# Patient Record
Sex: FEMALE | Race: BLACK OR AFRICAN AMERICAN | NOT HISPANIC OR LATINO | Employment: UNEMPLOYED | URBAN - METROPOLITAN AREA
[De-identification: names, ages, dates, MRNs, and addresses within clinical notes are randomized per-mention and may not be internally consistent; named-entity substitution may affect disease eponyms.]

---

## 2018-06-16 ENCOUNTER — HOSPITAL ENCOUNTER (EMERGENCY)
Facility: OTHER | Age: 38
Discharge: HOME | End: 2018-06-16
Attending: EMERGENCY MEDICINE
Payer: MEDICAID

## 2018-06-16 VITALS
WEIGHT: 194 LBS | HEART RATE: 55 BPM | HEIGHT: 63 IN | RESPIRATION RATE: 17 BRPM | OXYGEN SATURATION: 99 % | DIASTOLIC BLOOD PRESSURE: 92 MMHG | TEMPERATURE: 98 F | SYSTOLIC BLOOD PRESSURE: 167 MMHG | BODY MASS INDEX: 34.38 KG/M2

## 2018-06-16 DIAGNOSIS — M25.562 LEFT KNEE PAIN: Primary | ICD-10-CM

## 2018-06-16 LAB
B-HCG UR QL: NEGATIVE
CTP QC/QA: YES

## 2018-06-16 PROCEDURE — 63600175 PHARM REV CODE 636 W HCPCS: Performed by: EMERGENCY MEDICINE

## 2018-06-16 PROCEDURE — 99284 EMERGENCY DEPT VISIT MOD MDM: CPT | Mod: 25

## 2018-06-16 PROCEDURE — 81025 URINE PREGNANCY TEST: CPT | Performed by: EMERGENCY MEDICINE

## 2018-06-16 PROCEDURE — 96372 THER/PROPH/DIAG INJ SC/IM: CPT

## 2018-06-16 RX ORDER — ALBUTEROL SULFATE 90 UG/1
1-2 AEROSOL, METERED RESPIRATORY (INHALATION) EVERY 6 HOURS PRN
Qty: 1 INHALER | Refills: 0 | Status: SHIPPED | OUTPATIENT
Start: 2018-06-16 | End: 2019-06-16

## 2018-06-16 RX ORDER — LISINOPRIL AND HYDROCHLOROTHIAZIDE 12.5; 2 MG/1; MG/1
1 TABLET ORAL DAILY
Qty: 30 TABLET | Refills: 0 | Status: SHIPPED | OUTPATIENT
Start: 2018-06-16

## 2018-06-16 RX ORDER — KETOROLAC TROMETHAMINE 30 MG/ML
15 INJECTION, SOLUTION INTRAMUSCULAR; INTRAVENOUS
Status: COMPLETED | OUTPATIENT
Start: 2018-06-16 | End: 2018-06-16

## 2018-06-16 RX ORDER — LISINOPRIL AND HYDROCHLOROTHIAZIDE 12.5; 2 MG/1; MG/1
1 TABLET ORAL DAILY
COMMUNITY
End: 2018-06-16

## 2018-06-16 RX ORDER — IBUPROFEN 400 MG/1
400 TABLET ORAL 3 TIMES DAILY PRN
Qty: 20 TABLET | Refills: 0 | Status: SHIPPED | OUTPATIENT
Start: 2018-06-16

## 2018-06-16 RX ADMIN — KETOROLAC TROMETHAMINE 15 MG: 30 INJECTION, SOLUTION INTRAMUSCULAR at 10:06

## 2018-06-17 NOTE — ED PROVIDER NOTES
"Encounter Date: 2018    SCRIBE #1 NOTE: I, Bina Reyna, am scribing for, and in the presence of, Dr. Terrazas.       History     Chief Complaint   Patient presents with    Knee Pain     Pt reports left knee pain and swelling x 3 days, reports running out of "fluid pill" last week     Time seen by provider: 9:56 PM    This is a 37 y.o. female, with history of HTN, who presents with complaint of left knee pain and swelling for a few days. She denies trauma or fall. She reports SOB and chronic leg shortening. She denies fever, chest pain, difficulty ambulating, numbness, or weakness. She has not taken lisinopril-hydrochlorothiazide in three weeks. She moved here from [place] one month ago and has not established primary care here.      The history is provided by the patient.     Review of patient's allergies indicates:   Allergen Reactions    Compazine [prochlorperazine]     Geodon [ziprasidone hcl]      Past Medical History:   Diagnosis Date    Hypertension      Past Surgical History:   Procedure Laterality Date     SECTION      HIP FUSION Left      No family history on file.  Social History   Substance Use Topics    Smoking status: Current Every Day Smoker    Smokeless tobacco: Not on file    Alcohol use No     Review of Systems   Constitutional: Negative for fever.   HENT: Negative for sore throat.    Respiratory: Positive for shortness of breath.    Cardiovascular: Negative for chest pain.   Gastrointestinal: Negative for abdominal pain, nausea and vomiting.   Genitourinary: Negative for dysuria.   Musculoskeletal: Positive for arthralgias (left knee) and joint swelling (left knee). Negative for back pain and gait problem.        Positive for leg shortening.   Skin: Negative for rash.   Neurological: Negative for weakness, numbness and headaches.   Hematological: Does not bruise/bleed easily.       Physical Exam     Initial Vitals [18 2101]   BP Pulse Resp Temp SpO2   138/61 70 18 98.1 °F " (36.7 °C) 98 %      MAP       --         Physical Exam    Nursing note and vitals reviewed.  Constitutional: She appears well-developed and well-nourished. She is not diaphoretic. No distress.   HENT:   Head: Normocephalic and atraumatic.   Eyes: Conjunctivae and EOM are normal.   Neck: Normal range of motion. Neck supple.   Cardiovascular: Normal rate, regular rhythm and normal heart sounds. Exam reveals no gallop and no friction rub.    No murmur heard.  Pulses:       Dorsalis pedis pulses are 2+ on the right side, and 2+ on the left side.   Pulmonary/Chest: Breath sounds normal. No respiratory distress. She has no wheezes. She has no rhonchi. She has no rales.   Musculoskeletal: Normal range of motion. She exhibits tenderness. She exhibits no edema.   Left knee with diffuse tenderness. No bony tenderness of the tibia.    Neurological: She is alert and oriented to person, place, and time.   Skin: No erythema.   No warmth to left knee.         ED Course   Procedures  Labs Reviewed   POCT URINE PREGNANCY     Imaging Results          X-Ray Knee 3 View Left (Final result)  Result time 06/16/18 22:29:22    Final result by Leoncio Louise MD (06/16/18 22:29:22)                 Impression:      No radiographic evidence of acute osseous injury of the left knee.      Electronically signed by: Leoncio Louise MD  Date:    06/16/2018  Time:    22:29             Narrative:    EXAMINATION:  XR KNEE 3 VIEW LEFT    CLINICAL HISTORY:  Pain in left knee    TECHNIQUE:  AP, lateral, and Merchant views of the left knee were performed.    COMPARISON:  None    FINDINGS:  There is no radiographic evidence of acute fracture or dislocation.  No significant joint effusion appreciated.  Radiopaque foreign body within the visualized soft tissues.                                   X-Ray Knee 3 View Left   Final Result      No radiographic evidence of acute osseous injury of the left knee.         Electronically signed by: Leoncio Louise  MD   Date:    06/16/2018   Time:    22:29        X-Rays:   Independently Interpreted Readings:   Other Readings:  Left Knee: No fracture or dislocation. No effusion.    Medical Decision Making:   Clinical Tests:   Lab Tests: Ordered and Reviewed  Radiological Study: Ordered and Reviewed  ED Management:  Well-appearing patient presents complaining primarily of left knee pain. She reports she has been working a lot standing and bending.  There is no sign of septic joint.  It is not hot.  It is swollen.  There is no erythema.  She has full range of motion.  She is able to bear weight.  Likely overuse injury. Ice wrap and Ace.  X-ray negative for acute abnormalities.  It is unilateral unlikely related to her noncompliance with antihypertensives.  I have renewed her prescription at her request.  I have also refilled her albuterol at her request.  She reports asthma type symptoms but has normal exam.  No findings concerning for pulmonary embolism.  She is new to the area I provided her primary care follow-up.    I did have an extensive talk regarding signs to return for and need for follow up. Patient expressed understanding and will monitor symptoms closely and follow-up as needed.    BRYCE Terraazs M.D.  06/17/2018  1:02 AM              Scribe Attestation:   Scribe #1: I performed the above scribed service and the documentation accurately describes the services I performed. I attest to the accuracy of the note.    Attending Attestation:           Physician Attestation for Scribe:  Physician Attestation Statement for Scribe #1: I, Dr. Terrazas, reviewed documentation, as scribed by Bina Reyna in my presence, and it is both accurate and complete.                    Clinical Impression:     1. Left knee pain                                 Shayne Terrazas MD  06/17/18 0102

## 2018-06-17 NOTE — ED NOTES
"Pt has left knee "tightness" with pain and swelling for a few days. Pt has not taken BP medications for a couple weeks. Pt denies trauma.  "

## 2018-07-13 ENCOUNTER — HOSPITAL ENCOUNTER (EMERGENCY)
Facility: OTHER | Age: 38
Discharge: HOME | End: 2018-07-13
Attending: EMERGENCY MEDICINE
Payer: MEDICAID

## 2018-07-13 VITALS
RESPIRATION RATE: 20 BRPM | DIASTOLIC BLOOD PRESSURE: 114 MMHG | HEART RATE: 82 BPM | WEIGHT: 220.44 LBS | OXYGEN SATURATION: 96 % | TEMPERATURE: 98 F | HEIGHT: 63 IN | BODY MASS INDEX: 39.06 KG/M2 | SYSTOLIC BLOOD PRESSURE: 176 MMHG

## 2018-07-13 DIAGNOSIS — F19.10 POLYSUBSTANCE ABUSE: Primary | ICD-10-CM

## 2018-07-13 DIAGNOSIS — I10 HYPERTENSION, POOR CONTROL: ICD-10-CM

## 2018-07-13 PROCEDURE — 99283 EMERGENCY DEPT VISIT LOW MDM: CPT

## 2018-07-14 NOTE — ED NOTES
Patient Belongings.   Blue hand towel  1 beige purse  1 pack of cigarettes   Irvin watch  1 bottle of perfume  Toothbrush  Pack of cards  Lotions  Toothpaste  Hand   Employee ID card  Misc. Papers  Midol Tablets  Hand mirror w/ crack   Mult. Cards Paper and Plastic   Social Security Card   2 lighters  2 pipes with drug paraphernalia given to security for confiscation

## 2018-07-14 NOTE — ED PROVIDER NOTES
"Encounter Date: 7/13/2018    SCRIBE #1 NOTE: I, Javier Aden, am scribing for, and in the presence of, Dr. Rubio.       History     Chief Complaint   Patient presents with    Suicidal     pt states she wants to end her life because she can't get off drugs, wants to OD     Seen by provider: 7:19 PM    Patient is a 37 y.o. female who presents to the ED for drug detoxification. Patient states she has been injecting heroin and smoking crack-cocaine for "2 months straight." She states "I'm tired of using, I need some help. I'm here to get detox. I need help for shooting heroin and smoking crack cocaine." Patient states she last used drugs at 2:00 AM this morning and reports diffuse body pain currently. She states this is the first place she has come in search of detox. She states "they told me I had to say I was suicidal to get help for detox." She denies suicidal ideations and states "I don't want to end my life I just want to get off the drugs, I want to make my life better." She reports a history of PTSD, bipolar disorder, and schizophrenia. She states she has had psychiatric care in the past and is prescribed medications, but has been non-compliant for many years since her childhood. She also reports a history of HTN and states she is supposed to take lisinopril, however she has been non-compliant for an unclear amount of time. She reports allergies to compazine and Geodon. She does not have a local PCP or clinic, and notes she moved to [place] from [place] 2 months ago. She states she was not using any drugs when she was living in [place]. She also admits to drinking "a couple beers a day," and smoking tobacco.      The history is provided by the patient.     Review of patient's allergies indicates:   Allergen Reactions    Compazine [prochlorperazine]     Geodon [ziprasidone hcl]      Past Medical History:   Diagnosis Date    Bipolar 1 disorder     Depression     Hypertension     PTSD (post-traumatic " stress disorder)     Schizophrenia      Past Surgical History:   Procedure Laterality Date     SECTION      HIP FUSION Left      History reviewed. No pertinent family history.  Social History   Substance Use Topics    Smoking status: Current Every Day Smoker    Smokeless tobacco: Never Used    Alcohol use No     Review of Systems   Constitutional: Negative for chills and fever.   HENT: Negative for congestion.    Eyes: Negative for visual disturbance.   Respiratory: Negative for shortness of breath.    Cardiovascular: Negative for chest pain.   Gastrointestinal: Negative for diarrhea, nausea and vomiting.   Genitourinary: Negative for dysuria.   Musculoskeletal: Positive for myalgias. Negative for gait problem.   Skin: Negative for rash.   Neurological: Negative for headaches.   Psychiatric/Behavioral: Negative for confusion, hallucinations, self-injury and suicidal ideas.        Positive for drug addiction.       Physical Exam     Initial Vitals [18 1904]   BP Pulse Resp Temp SpO2   (!) 176/114 82 20 98.3 °F (36.8 °C) 96 %      MAP       --         Physical Exam    Nursing note and vitals reviewed.  Constitutional: She appears well-developed and well-nourished. She is not diaphoretic. She is Obese . No distress.   Anxious, but in no distress.   HENT:   Head: Normocephalic and atraumatic.   Eyes: Conjunctivae and EOM are normal. Pupils are equal, round, and reactive to light.   Neck: Normal range of motion. Neck supple.   Cardiovascular: Normal rate, regular rhythm and normal heart sounds. Exam reveals no gallop and no friction rub.    No murmur heard.  Pulmonary/Chest: Breath sounds normal. No respiratory distress. She has no wheezes. She has no rhonchi. She has no rales.   Abdominal: Soft. There is no tenderness.   Musculoskeletal: Normal range of motion.   Neurological: She is alert and oriented to person, place, and time.   Skin: Skin is warm and dry.   Psychiatric:   Linear goal-directed  thought. No delirium or delusion. Denies any desire to harm herself.         ED Course   Procedures  Labs Reviewed - No data to display       Imaging Results    None                     Scribe Attestation:   Scribe #1: I performed the above scribed service and the documentation accurately describes the services I performed. I attest to the accuracy of the note.    Attending Attestation:           Physician Attestation for Scribe:  Physician Attestation Statement for Scribe #1: I, Dr. Rubio, reviewed documentation, as scribed by Javier Aden in my presence, and it is both accurate and complete.           Patient presents initially reporting suicidal ideation.  Stating that she is so tired of her drug dependency that she wants to kill herself.  Once the patient was informed of the plan to place her on a 72 hr hold, and have her transferred for psychiatric evaluation and stabilization , and she realized that this was not specifically a detox treatment center, she then reported that she was only lying about the suicidal ideation to get into a detox program because that is what they told me I had to do the patient is adamant that she does not want to kill herself she actually is looking for help to improve her life, not and it.  She has been given a list of the psychiatric clinics as well as substance abuse clinics, detox centers.             Clinical Impression:     1. Polysubstance abuse    2. Hypertension, poor control                                  Cyrus Rubio II, MD  07/16/18 1134

## 2018-07-14 NOTE — ED TRIAGE NOTES
"Patient presents to ED stating she wanted to overdose on drugs. Patient reports she has been shooting up heroin and smoking crack for the past two months. Patient reports she wants to get help to detox, states "I was told I had to say I wanted to kill myself to get into detox". Patient states "If y'all can't help me detox then I want to leave. I don't need to be on suicidal watch or 72 hours. I just need to get into detox". Pt AAO x4.    "

## 2018-07-14 NOTE — ED NOTES
Patient changed into paper scrubs and searched for harmful objects. ED tech Renae at bedside for direct observation.

## 2018-07-14 NOTE — ED NOTES
"Belongings given back to patient. Patient given information on detox clinics. Patient escorted to waiting room by RN. Patient speaking with  saying "They don't do detox here. All they would do is put me on a 72 hour suicidal watch and I don't need that because I'm not suicidal. I just need detox".   "

## 2019-12-14 ENCOUNTER — HOSPITAL ENCOUNTER (EMERGENCY)
Facility: OTHER | Age: 39
Discharge: HOME | End: 2019-12-14
Attending: EMERGENCY MEDICINE

## 2019-12-14 VITALS
HEART RATE: 87 BPM | RESPIRATION RATE: 20 BRPM | WEIGHT: 220 LBS | DIASTOLIC BLOOD PRESSURE: 93 MMHG | OXYGEN SATURATION: 100 % | HEIGHT: 63 IN | BODY MASS INDEX: 38.98 KG/M2 | SYSTOLIC BLOOD PRESSURE: 161 MMHG | TEMPERATURE: 99 F

## 2019-12-14 DIAGNOSIS — F11.93 WITHDRAWAL FROM OPIOIDS: ICD-10-CM

## 2019-12-14 DIAGNOSIS — R07.9 CHEST PAIN: ICD-10-CM

## 2019-12-14 DIAGNOSIS — F11.29 OPIOID DEPENDENCE WITH OPIOID-INDUCED DISORDER: Primary | ICD-10-CM

## 2019-12-14 PROCEDURE — 99284 EMERGENCY DEPT VISIT MOD MDM: CPT | Mod: 25

## 2019-12-14 PROCEDURE — 93010 ELECTROCARDIOGRAM REPORT: CPT | Mod: ,,, | Performed by: INTERNAL MEDICINE

## 2019-12-14 PROCEDURE — 25000003 PHARM REV CODE 250: Performed by: EMERGENCY MEDICINE

## 2019-12-14 PROCEDURE — 93010 EKG 12-LEAD: ICD-10-PCS | Mod: ,,, | Performed by: INTERNAL MEDICINE

## 2019-12-14 PROCEDURE — 93005 ELECTROCARDIOGRAM TRACING: CPT

## 2019-12-14 RX ORDER — CHLORDIAZEPOXIDE HYDROCHLORIDE 25 MG/1
25 CAPSULE, GELATIN COATED ORAL 3 TIMES DAILY
Qty: 30 CAPSULE | Refills: 0 | Status: SHIPPED | OUTPATIENT
Start: 2019-12-14 | End: 2019-12-24

## 2019-12-14 RX ORDER — CHLORDIAZEPOXIDE HYDROCHLORIDE 10 MG/1
50 CAPSULE, GELATIN COATED ORAL ONCE
Status: COMPLETED | OUTPATIENT
Start: 2019-12-14 | End: 2019-12-14

## 2019-12-14 RX ORDER — CLONIDINE HYDROCHLORIDE 0.1 MG/1
0.1 TABLET ORAL 3 TIMES DAILY
Qty: 30 TABLET | Refills: 0 | Status: SHIPPED | OUTPATIENT
Start: 2019-12-14 | End: 2020-01-05 | Stop reason: CLARIF

## 2019-12-14 RX ORDER — GABAPENTIN 300 MG/1
300 CAPSULE ORAL ONCE
Status: COMPLETED | OUTPATIENT
Start: 2019-12-14 | End: 2019-12-14

## 2019-12-14 RX ORDER — CLONIDINE HYDROCHLORIDE 0.1 MG/1
0.1 TABLET ORAL
Status: COMPLETED | OUTPATIENT
Start: 2019-12-14 | End: 2019-12-14

## 2019-12-14 RX ORDER — GABAPENTIN 300 MG/1
300 CAPSULE ORAL 3 TIMES DAILY
Status: DISCONTINUED | OUTPATIENT
Start: 2019-12-14 | End: 2019-12-14

## 2019-12-14 RX ADMIN — CHLORDIAZEPOXIDE HYDROCHLORIDE 50 MG: 10 CAPSULE ORAL at 05:12

## 2019-12-14 RX ADMIN — CLONIDINE HYDROCHLORIDE 0.1 MG: 0.1 TABLET ORAL at 05:12

## 2019-12-14 RX ADMIN — GABAPENTIN 300 MG: 300 CAPSULE ORAL at 05:12

## 2019-12-14 NOTE — ED NOTES
"Pt states "I had a tubal ligation, I cant be pregnant, I dont have to pee right now." MD notified. No need for UPT at this per Dr. Pedraza.   "

## 2019-12-14 NOTE — ED NOTES
"States hx of heroine IV abuse, last used x yesterday "seomtime in the early morning." states she is here for detox, stating "I just need detox, they told me to come here for that and I can go to [facility] tomorrow." Also reporting upper chest pain, rib pain, SOB, "pain all over", lower leg cramping. Pt AAOx4 and appropriate at this time. Respirations even and unlabored. No acute distress noted.    "

## 2019-12-14 NOTE — ED PROVIDER NOTES
CHIEF COMPLAINT  Chief Complaint   Patient presents with    detoxing off of herion     pt with c/o detoxing off of herion to go to  [facility] tomorrow .       HPI  Maki Amezquita is a 39 y.o. female with a past medical history of hypertension who presents with detoxing off of heroin today. She also reports associated fever, chills, nausea, vomiting, diarrhea, and generalized body aches. She states she was told by [facility] to come here as they do not have room for her until 8:00 am tomorrow morning. Her last use was yesterday morning.    This is the extent of the patient's complaints at this time.       PAST MEDICAL HISTORY  Past Medical History:   Diagnosis Date    Bipolar 1 disorder     Depression     Hypertension     PTSD (post-traumatic stress disorder)     Schizophrenia        CURRENT MEDICATIONS    No current facility-administered medications for this encounter.     Current Outpatient Medications:     albuterol 90 mcg/actuation inhaler, Inhale 1-2 puffs into the lungs every 6 (six) hours as needed for Wheezing. Rescue, Disp: 1 Inhaler, Rfl: 0    chlordiazepoxide (LIBRIUM) 25 MG Cap, Take 1 capsule (25 mg total) by mouth 3 (three) times daily. for 10 days, Disp: 30 capsule, Rfl: 0    cloNIDine (CATAPRES) 0.1 MG tablet, Take 1 tablet (0.1 mg total) by mouth 3 (three) times daily., Disp: 30 tablet, Rfl: 0    ibuprofen (ADVIL,MOTRIN) 400 MG tablet, Take 1 tablet (400 mg total) by mouth 3 (three) times daily as needed., Disp: 20 tablet, Rfl: 0    lisinopril-hydrochlorothiazide (PRINZIDE,ZESTORETIC) 20-12.5 mg per tablet, Take 1 tablet by mouth once daily., Disp: 30 tablet, Rfl: 0    ALLERGIES    Review of patient's allergies indicates:   Allergen Reactions    Compazine [prochlorperazine]     Geodon [ziprasidone hcl]        SURGICAL HISTORY    Past Surgical History:   Procedure Laterality Date     SECTION      HIP FUSION Left        SOCIAL HISTORY    Social History     Socioeconomic  History    Marital status: Single     Spouse name: Not on file    Number of children: Not on file    Years of education: Not on file    Highest education level: Not on file   Occupational History    Not on file   Social Needs    Financial resource strain: Not on file    Food insecurity:     Worry: Not on file     Inability: Not on file    Transportation needs:     Medical: Not on file     Non-medical: Not on file   Tobacco Use    Smoking status: Current Every Day Smoker    Smokeless tobacco: Never Used   Substance and Sexual Activity    Alcohol use: No    Drug use: Yes     Types: Marijuana    Sexual activity: Not on file   Lifestyle    Physical activity:     Days per week: Not on file     Minutes per session: Not on file    Stress: Not on file   Relationships    Social connections:     Talks on phone: Not on file     Gets together: Not on file     Attends Yarsanism service: Not on file     Active member of club or organization: Not on file     Attends meetings of clubs or organizations: Not on file     Relationship status: Not on file   Other Topics Concern    Not on file   Social History Narrative    Not on file       FAMILY HISTORY    No family history on file.    REVIEW OF SYSTEMS    Constitutional:  Positive chills. Positive for generalized body aches.  Eyes:  No redness, pain, or discharge.   HENT:  No ear pain, no sudden onset headache, no throat pain.  Respiratory:  No wheezing, cough, or shortness of breath.   Cardiovascular:  No chest pain or palpitations.  GI:  No abdominal pain. Positive for nausea, vomiting, and diarrhea.   : No dysuria or discharge.  Musculoskeletal:  No injury; full range of motion.   Skin:  No rash, abscess, or laceration.  Psychiatric: No suicidal ideations, homicidal ideations, auditory or visual hallucinations  Neurologic:  No focal weakness or sensory changes.   All Systems otherwise negative except as noted in the Review of Systems and History of Present  "Illness.    PHYSICAL EXAM    VITAL SIGNS: BP (!) 161/93   Pulse 98   Temp 98.9 °F (37.2 °C) (Oral)   Resp 20   Ht 5' 3" (1.6 m)   Wt 99.8 kg (220 lb)   SpO2 100%   BMI 38.97 kg/m²    Constitutional:  No acute distress.  Well developed, well nourished, alert & oriented x 3, non-toxic appearance.   HENT:  Normocephalic, atraumatic.  Normal ears, nose, and throat.  Eyes:  PERRL, EOMI, conjunctiva normal.  Neck: Normal range of motion, no tenderness, supple.  Respiratory:  Nonlabored breathing with normal breath sounds; no respiratory distress.  Cardiovascular:  RRR with no pulse deficit.  : normal external genitalia   GI:  Soft, nontender, no rebound or guarding.  Musculoskeletal: Normal ROM, no tenderness, injury, edema.  Integument:  Warm, dry skin without infection or injury.  Neurologic:  Normal motor, sensation with no focal deficit.  Psychiatric:  Affect normal, Judgment normal, Mood normal. No SI, HI and not gravely disabled.    LABS  Pertinent labs reviewed. (See chart for details)   Labs Reviewed - No data to display        PROCEDURES    Procedures    Medications   cloNIDine tablet 0.1 mg (0.1 mg Oral Given 12/14/19 1730)   chlordiazepoxide capsule 50 mg (50 mg Oral Given 12/14/19 1749)   gabapentin capsule 300 mg (300 mg Oral Given 12/14/19 1730)       ED COURSE & MEDICAL DECISION MAKING      Pertinent & Imaging studies reviewed. (See chart for details)    Differential Diagnosis: Clearly having withdrawal symptoms, body aches, myalgias expected after cessation of heroin. No evidence of sepsis, lungs clear.  Plan for symptomatic care with clonidine, librium and gabapentin; [facility]           Discontinued Medications    No medications on file       New Prescriptions    CHLORDIAZEPOXIDE (LIBRIUM) 25 MG CAP    Take 1 capsule (25 mg total) by mouth 3 (three) times daily. for 10 days    CLONIDINE (CATAPRES) 0.1 MG TABLET    Take 1 tablet (0.1 mg total) by mouth 3 (three) times daily.       FINAL " DIAGNOSIS  1. Opiate Withdrawal    DISPOSITION  Patient discharged in stable condition.    I discussed with patient and/or family/caretaker that this evaluation in the ED does not suggest any emergent or life threatening condition medical condition requiring admission or immediate intervention beyond what was provided in the ED.  Regardless, an unremarkable evaluation in the ED does not preclude the development or presence of a serious or life threatening condition. As such, patient was instructed to return immediately for any worsening or change in current symptoms.       Critical care time spent with this patient (not including billable procedures) was 15 minutes.    Bronwyn Pedraza MD  Emergency Medicine  [facility]  12/14/2019 4:45 PM    I have reviewed the notes, assessments, and/or procedures performed by Aelxis sullivan and agree with documentation of this patient    Please pardon typos or dictation errors, as this note was transcribed using Cribspot direct dictation software.         Bronwyn Pedraza MD  12/14/19 [id number]

## 2019-12-15 NOTE — ED NOTES
Pt AAOx4 and appropriate at this time. Respirations even and unlabored. No acute distress noted. Lying in ED stretcher. Pt updated on POC. Bed is locked and in lowest position with side rails up x2. Call bell within reach and pt oriented to use of call bell.

## 2019-12-15 NOTE — ED NOTES
Upon RN entering room to discharge pt, pt not in room, belongings gone. Left without discharge paperwork.

## 2020-01-05 ENCOUNTER — HOSPITAL ENCOUNTER (EMERGENCY)
Facility: OTHER | Age: 40
Discharge: HOME | End: 2020-01-06
Attending: EMERGENCY MEDICINE
Payer: MEDICAID

## 2020-01-05 VITALS
HEIGHT: 63 IN | DIASTOLIC BLOOD PRESSURE: 84 MMHG | HEART RATE: 84 BPM | SYSTOLIC BLOOD PRESSURE: 136 MMHG | WEIGHT: 200 LBS | OXYGEN SATURATION: 98 % | TEMPERATURE: 99 F | BODY MASS INDEX: 35.44 KG/M2 | RESPIRATION RATE: 16 BRPM

## 2020-01-05 DIAGNOSIS — K08.89 PAIN, DENTAL: Primary | ICD-10-CM

## 2020-01-05 LAB
B-HCG UR QL: NEGATIVE
CTP QC/QA: YES

## 2020-01-05 PROCEDURE — 99284 EMERGENCY DEPT VISIT MOD MDM: CPT | Mod: 25

## 2020-01-05 PROCEDURE — 96372 THER/PROPH/DIAG INJ SC/IM: CPT

## 2020-01-05 PROCEDURE — 81025 URINE PREGNANCY TEST: CPT | Performed by: EMERGENCY MEDICINE

## 2020-01-05 PROCEDURE — 63600175 PHARM REV CODE 636 W HCPCS: Performed by: PHYSICIAN ASSISTANT

## 2020-01-05 RX ORDER — KETOROLAC TROMETHAMINE 30 MG/ML
15 INJECTION, SOLUTION INTRAMUSCULAR; INTRAVENOUS
Status: COMPLETED | OUTPATIENT
Start: 2020-01-05 | End: 2020-01-05

## 2020-01-05 RX ORDER — FLUCONAZOLE 150 MG/1
150 TABLET ORAL ONCE
Qty: 1 TABLET | Refills: 0 | Status: SHIPPED | OUTPATIENT
Start: 2020-01-06 | End: 2020-01-06

## 2020-01-05 RX ORDER — PENICILLIN V POTASSIUM 500 MG/1
500 TABLET, FILM COATED ORAL 4 TIMES DAILY
Qty: 40 TABLET | Refills: 0 | Status: SHIPPED | OUTPATIENT
Start: 2020-01-05 | End: 2020-01-15

## 2020-01-05 RX ORDER — OXAPROZIN 600 MG/1
600 TABLET, FILM COATED ORAL DAILY
Qty: 10 TABLET | Refills: 0 | Status: SHIPPED | OUTPATIENT
Start: 2020-01-05

## 2020-01-05 RX ADMIN — KETOROLAC TROMETHAMINE 15 MG: 30 INJECTION, SOLUTION INTRAMUSCULAR; INTRAVENOUS at 11:01

## 2020-01-06 NOTE — ED PROVIDER NOTES
Encounter Date: 2020       History     Chief Complaint   Patient presents with    Dental Pain     pt presents to ED with c/o oral pain, wisdom teeth are infected and needs an antibiotic     Patient is 39 year old female who presents with complaints of dental pain for the past two days PTA. She reports no dental fracture, trauma or injury. She reports that she has not been taking any medications PTA. She reports she has a dental appointment in 4 days. She denies fever, chills, nausea, vomiting, chest pain or shortness of breath.  She is currently unaccompanied in the ER.        Review of patient's allergies indicates:   Allergen Reactions    Compazine [prochlorperazine]     Geodon [ziprasidone hcl]      Past Medical History:   Diagnosis Date    Bipolar 1 disorder     Depression     Hypertension     PTSD (post-traumatic stress disorder)     Schizophrenia      Past Surgical History:   Procedure Laterality Date     SECTION      HIP FUSION Left      No family history on file.  Social History     Tobacco Use    Smoking status: Current Every Day Smoker    Smokeless tobacco: Never Used   Substance Use Topics    Alcohol use: No    Drug use: Yes     Types: Marijuana     Review of Systems   Constitutional: Negative for fever.   HENT: Positive for dental problem. Negative for sore throat.    Respiratory: Negative for shortness of breath.    Cardiovascular: Negative for chest pain.   Gastrointestinal: Negative for nausea.   Genitourinary: Negative for dysuria.   Musculoskeletal: Negative for back pain.   Skin: Negative for rash.   Neurological: Negative for weakness.   Hematological: Does not bruise/bleed easily.       Physical Exam     Initial Vitals [20 2255]   BP Pulse Resp Temp SpO2   136/84 84 16 98.7 °F (37.1 °C) 98 %      MAP       --         Physical Exam    Nursing note and vitals reviewed.  Constitutional: She appears well-developed and well-nourished. She is not diaphoretic. No  distress.   Healthy-appearing female in no acute distress or apparent pain. She makes good eye contact, speaks in clear full sentences ambulates with ease.  Manages oral secretions without difficulty.   HENT:   Head: Normocephalic and atraumatic.   Overall poor dentition with tenderness to tooth tap to left upper lower posterior molar tooth.  Surrounding gingival erythema with no abscess requiring I and D. no trismus or lingual elevation.   Eyes: Conjunctivae and EOM are normal. Pupils are equal, round, and reactive to light. Right eye exhibits no discharge. Left eye exhibits no discharge. No scleral icterus.   Neck: Normal range of motion.   Cardiovascular: Normal rate, regular rhythm, normal heart sounds and intact distal pulses. Exam reveals no gallop and no friction rub.    No murmur heard.  Pulmonary/Chest: Breath sounds normal. She has no wheezes. She has no rhonchi. She has no rales.   Abdominal: Soft. Bowel sounds are normal. There is no tenderness. There is no rebound and no guarding.   Musculoskeletal: Normal range of motion. She exhibits no edema or tenderness.   Lymphadenopathy:     She has no cervical adenopathy.   Neurological: She is alert and oriented to person, place, and time. She has normal strength. No cranial nerve deficit or sensory deficit. GCS score is 15. GCS eye subscore is 4. GCS verbal subscore is 5. GCS motor subscore is 6.   Skin: Skin is warm. Capillary refill takes less than 2 seconds. No rash and no abscess noted. No erythema.   Psychiatric: She has a normal mood and affect. Her behavior is normal. Thought content normal.         ED Course   Procedures  Labs Reviewed   POCT URINE PREGNANCY          Imaging Results    None          Medical Decision Making:   ED Management:  Urgent evaluation a 39-year-old female who presents with complaints concerning for possible abscess.  She is afebrile, nontoxic appearing, hemodynamically stable.  Physical exam outlined above and reveals positive  tooth tap with surrounding gingival erythema.  No purulence or bleeding or abscess requiring I and D. No evidence of deep space infection.  No IV antibiotics or hospital admission felt warranted.  Will discharge with penicillin and nonsteroidal anti-inflammatories and strict instruction to comply with previously scheduled dental appointment on Thursday.  She is given very strict ED return precautions of worsening symptoms present verbalized understanding.  She is stable for discharge.                                 Clinical Impression:       ICD-10-CM ICD-9-CM   1. Pain, dental K08.89 525.9                             Rosanna Cotton PA-C  01/06/20 0019

## 2020-01-06 NOTE — ED NOTES
Pain to wisdom teeth on left side upper and lower for 3- days and has an appt Thursday with some swelling. No drainage noted

## 2020-02-04 ENCOUNTER — HOSPITAL ENCOUNTER (EMERGENCY)
Facility: OTHER | Age: 40
Discharge: HOME | End: 2020-02-04
Attending: EMERGENCY MEDICINE
Payer: MEDICAID

## 2020-02-04 VITALS
HEART RATE: 84 BPM | TEMPERATURE: 98 F | SYSTOLIC BLOOD PRESSURE: 160 MMHG | BODY MASS INDEX: 33.66 KG/M2 | HEIGHT: 63 IN | RESPIRATION RATE: 18 BRPM | DIASTOLIC BLOOD PRESSURE: 88 MMHG | WEIGHT: 190 LBS | OXYGEN SATURATION: 99 %

## 2020-02-04 DIAGNOSIS — M54.2 NECK PAIN: ICD-10-CM

## 2020-02-04 DIAGNOSIS — S16.1XXA CERVICAL MUSCLE STRAIN, INITIAL ENCOUNTER: Primary | ICD-10-CM

## 2020-02-04 LAB
B-HCG UR QL: NEGATIVE
CTP QC/QA: YES

## 2020-02-04 PROCEDURE — 25000003 PHARM REV CODE 250: Performed by: PHYSICIAN ASSISTANT

## 2020-02-04 PROCEDURE — 99284 EMERGENCY DEPT VISIT MOD MDM: CPT | Mod: 25

## 2020-02-04 PROCEDURE — 81025 URINE PREGNANCY TEST: CPT | Performed by: EMERGENCY MEDICINE

## 2020-02-04 PROCEDURE — 96372 THER/PROPH/DIAG INJ SC/IM: CPT

## 2020-02-04 PROCEDURE — 63600175 PHARM REV CODE 636 W HCPCS: Performed by: PHYSICIAN ASSISTANT

## 2020-02-04 RX ORDER — KETOROLAC TROMETHAMINE 30 MG/ML
30 INJECTION, SOLUTION INTRAMUSCULAR; INTRAVENOUS
Status: COMPLETED | OUTPATIENT
Start: 2020-02-04 | End: 2020-02-04

## 2020-02-04 RX ORDER — ACETAMINOPHEN 500 MG
1000 TABLET ORAL
Status: COMPLETED | OUTPATIENT
Start: 2020-02-04 | End: 2020-02-04

## 2020-02-04 RX ORDER — ORPHENADRINE CITRATE 100 MG/1
100 TABLET, EXTENDED RELEASE ORAL 2 TIMES DAILY
Qty: 14 TABLET | Refills: 0 | Status: SHIPPED | OUTPATIENT
Start: 2020-02-04 | End: 2020-02-11

## 2020-02-04 RX ORDER — ORPHENADRINE CITRATE 100 MG/1
100 TABLET, EXTENDED RELEASE ORAL
Status: COMPLETED | OUTPATIENT
Start: 2020-02-04 | End: 2020-02-04

## 2020-02-04 RX ADMIN — ACETAMINOPHEN 1000 MG: 500 TABLET ORAL at 12:02

## 2020-02-04 RX ADMIN — KETOROLAC TROMETHAMINE 30 MG: 30 INJECTION, SOLUTION INTRAMUSCULAR; INTRAVENOUS at 12:02

## 2020-02-04 RX ADMIN — ORPHENADRINE CITRATE 100 MG: 100 TABLET, EXTENDED RELEASE ORAL at 12:02
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
## 2020-07-15 ENCOUNTER — LAB VISIT (OUTPATIENT)
Dept: LAB | Facility: OTHER | Age: 40
End: 2020-07-15
Payer: MEDICAID

## 2020-07-15 DIAGNOSIS — Z20.822 SUSPECTED COVID-19 VIRUS INFECTION: ICD-10-CM

## 2020-07-15 DIAGNOSIS — Z03.818 ENCOUNTER FOR OBSERVATION FOR SUSPECTED EXPOSURE TO OTHER BIOLOGICAL AGENTS RULED OUT: ICD-10-CM

## 2020-07-15 PROCEDURE — U0003 INFECTIOUS AGENT DETECTION BY NUCLEIC ACID (DNA OR RNA); SEVERE ACUTE RESPIRATORY SYNDROME CORONAVIRUS 2 (SARS-COV-2) (CORONAVIRUS DISEASE [COVID-19]), AMPLIFIED PROBE TECHNIQUE, MAKING USE OF HIGH THROUGHPUT TECHNOLOGIES AS DESCRIBED BY CMS-2020-01-R: HCPCS

## 2020-07-19 LAB — SARS-COV-2 RNA RESP QL NAA+PROBE: NEGATIVE

## 2020-08-13 ENCOUNTER — LAB VISIT (OUTPATIENT)
Dept: LAB | Facility: OTHER | Age: 40
End: 2020-08-13
Payer: MEDICAID

## 2020-08-13 DIAGNOSIS — Z03.818 ENCOUNTER FOR OBSERVATION FOR SUSPECTED EXPOSURE TO OTHER BIOLOGICAL AGENTS RULED OUT: ICD-10-CM

## 2020-08-13 PROCEDURE — U0003 INFECTIOUS AGENT DETECTION BY NUCLEIC ACID (DNA OR RNA); SEVERE ACUTE RESPIRATORY SYNDROME CORONAVIRUS 2 (SARS-COV-2) (CORONAVIRUS DISEASE [COVID-19]), AMPLIFIED PROBE TECHNIQUE, MAKING USE OF HIGH THROUGHPUT TECHNOLOGIES AS DESCRIBED BY CMS-2020-01-R: HCPCS

## 2020-08-16 LAB — SARS-COV-2 RNA RESP QL NAA+PROBE: NOT DETECTED

## 2020-09-09 ENCOUNTER — LAB VISIT (OUTPATIENT)
Dept: PRIMARY CARE CLINIC | Facility: OTHER | Age: 40
End: 2020-09-09
Payer: MEDICAID

## 2020-09-09 DIAGNOSIS — Z03.818 ENCOUNTER FOR OBSERVATION FOR SUSPECTED EXPOSURE TO OTHER BIOLOGICAL AGENTS RULED OUT: ICD-10-CM

## 2020-09-09 PROCEDURE — U0003 INFECTIOUS AGENT DETECTION BY NUCLEIC ACID (DNA OR RNA); SEVERE ACUTE RESPIRATORY SYNDROME CORONAVIRUS 2 (SARS-COV-2) (CORONAVIRUS DISEASE [COVID-19]), AMPLIFIED PROBE TECHNIQUE, MAKING USE OF HIGH THROUGHPUT TECHNOLOGIES AS DESCRIBED BY CMS-2020-01-R: HCPCS

## 2020-09-11 LAB — SARS-COV-2 RNA RESP QL NAA+PROBE: NOT DETECTED

## 2020-10-07 ENCOUNTER — LAB VISIT (OUTPATIENT)
Dept: PRIMARY CARE CLINIC | Facility: OTHER | Age: 40
End: 2020-10-07
Payer: MEDICAID

## 2020-10-07 DIAGNOSIS — Z03.818 ENCOUNTER FOR OBSERVATION FOR SUSPECTED EXPOSURE TO OTHER BIOLOGICAL AGENTS RULED OUT: ICD-10-CM

## 2020-10-07 LAB — SARS-COV-2 RNA RESP QL NAA+PROBE: NOT DETECTED

## 2020-10-07 PROCEDURE — U0003 INFECTIOUS AGENT DETECTION BY NUCLEIC ACID (DNA OR RNA); SEVERE ACUTE RESPIRATORY SYNDROME CORONAVIRUS 2 (SARS-COV-2) (CORONAVIRUS DISEASE [COVID-19]), AMPLIFIED PROBE TECHNIQUE, MAKING USE OF HIGH THROUGHPUT TECHNOLOGIES AS DESCRIBED BY CMS-2020-01-R: HCPCS

## 2020-10-27 ENCOUNTER — LAB VISIT (OUTPATIENT)
Dept: PRIMARY CARE CLINIC | Facility: OTHER | Age: 40
End: 2020-10-27
Attending: INTERNAL MEDICINE
Payer: MEDICAID

## 2020-10-27 DIAGNOSIS — Z03.818 ENCOUNTER FOR OBSERVATION FOR SUSPECTED EXPOSURE TO OTHER BIOLOGICAL AGENTS RULED OUT: ICD-10-CM

## 2020-10-27 PROCEDURE — U0003 INFECTIOUS AGENT DETECTION BY NUCLEIC ACID (DNA OR RNA); SEVERE ACUTE RESPIRATORY SYNDROME CORONAVIRUS 2 (SARS-COV-2) (CORONAVIRUS DISEASE [COVID-19]), AMPLIFIED PROBE TECHNIQUE, MAKING USE OF HIGH THROUGHPUT TECHNOLOGIES AS DESCRIBED BY CMS-2020-01-R: HCPCS

## 2020-10-28 LAB — SARS-COV-2 RNA RESP QL NAA+PROBE: NOT DETECTED

## 2020-11-23 ENCOUNTER — LAB VISIT (OUTPATIENT)
Dept: PRIMARY CARE CLINIC | Facility: OTHER | Age: 40
End: 2020-11-23
Payer: MEDICAID

## 2020-11-23 DIAGNOSIS — Z03.818 ENCOUNTER FOR OBSERVATION FOR SUSPECTED EXPOSURE TO OTHER BIOLOGICAL AGENTS RULED OUT: ICD-10-CM

## 2020-11-23 PROCEDURE — U0003 INFECTIOUS AGENT DETECTION BY NUCLEIC ACID (DNA OR RNA); SEVERE ACUTE RESPIRATORY SYNDROME CORONAVIRUS 2 (SARS-COV-2) (CORONAVIRUS DISEASE [COVID-19]), AMPLIFIED PROBE TECHNIQUE, MAKING USE OF HIGH THROUGHPUT TECHNOLOGIES AS DESCRIBED BY CMS-2020-01-R: HCPCS

## 2020-11-24 LAB — SARS-COV-2 RNA RESP QL NAA+PROBE: NOT DETECTED

## 2020-12-22 ENCOUNTER — LAB VISIT (OUTPATIENT)
Dept: PRIMARY CARE CLINIC | Facility: OTHER | Age: 40
End: 2020-12-22
Attending: INTERNAL MEDICINE
Payer: MEDICAID

## 2020-12-22 DIAGNOSIS — Z03.818 ENCOUNTER FOR OBSERVATION FOR SUSPECTED EXPOSURE TO OTHER BIOLOGICAL AGENTS RULED OUT: ICD-10-CM

## 2020-12-22 PROCEDURE — U0003 INFECTIOUS AGENT DETECTION BY NUCLEIC ACID (DNA OR RNA); SEVERE ACUTE RESPIRATORY SYNDROME CORONAVIRUS 2 (SARS-COV-2) (CORONAVIRUS DISEASE [COVID-19]), AMPLIFIED PROBE TECHNIQUE, MAKING USE OF HIGH THROUGHPUT TECHNOLOGIES AS DESCRIBED BY CMS-2020-01-R: HCPCS

## 2020-12-24 LAB — SARS-COV-2 RNA RESP QL NAA+PROBE: NOT DETECTED

## 2021-01-20 ENCOUNTER — LAB VISIT (OUTPATIENT)
Dept: PRIMARY CARE CLINIC | Facility: OTHER | Age: 41
End: 2021-01-20
Payer: MEDICAID

## 2021-01-20 DIAGNOSIS — Z20.822 ENCOUNTER FOR LABORATORY TESTING FOR COVID-19 VIRUS: ICD-10-CM

## 2021-01-20 PROCEDURE — U0003 INFECTIOUS AGENT DETECTION BY NUCLEIC ACID (DNA OR RNA); SEVERE ACUTE RESPIRATORY SYNDROME CORONAVIRUS 2 (SARS-COV-2) (CORONAVIRUS DISEASE [COVID-19]), AMPLIFIED PROBE TECHNIQUE, MAKING USE OF HIGH THROUGHPUT TECHNOLOGIES AS DESCRIBED BY CMS-2020-01-R: HCPCS

## 2021-01-21 LAB — SARS-COV-2 RNA RESP QL NAA+PROBE: NOT DETECTED

## 2021-02-17 ENCOUNTER — LAB VISIT (OUTPATIENT)
Dept: PRIMARY CARE CLINIC | Facility: OTHER | Age: 41
End: 2021-02-17
Payer: MEDICAID

## 2021-02-17 DIAGNOSIS — Z20.822 ENCOUNTER FOR LABORATORY TESTING FOR COVID-19 VIRUS: ICD-10-CM

## 2021-02-17 PROCEDURE — U0003 INFECTIOUS AGENT DETECTION BY NUCLEIC ACID (DNA OR RNA); SEVERE ACUTE RESPIRATORY SYNDROME CORONAVIRUS 2 (SARS-COV-2) (CORONAVIRUS DISEASE [COVID-19]), AMPLIFIED PROBE TECHNIQUE, MAKING USE OF HIGH THROUGHPUT TECHNOLOGIES AS DESCRIBED BY CMS-2020-01-R: HCPCS

## 2021-02-18 LAB — SARS-COV-2 RNA RESP QL NAA+PROBE: NOT DETECTED

## 2021-03-16 ENCOUNTER — LAB VISIT (OUTPATIENT)
Dept: PRIMARY CARE CLINIC | Facility: OTHER | Age: 41
End: 2021-03-16
Payer: MEDICAID

## 2021-03-16 DIAGNOSIS — Z20.822 ENCOUNTER FOR LABORATORY TESTING FOR COVID-19 VIRUS: ICD-10-CM

## 2021-03-16 PROCEDURE — U0003 INFECTIOUS AGENT DETECTION BY NUCLEIC ACID (DNA OR RNA); SEVERE ACUTE RESPIRATORY SYNDROME CORONAVIRUS 2 (SARS-COV-2) (CORONAVIRUS DISEASE [COVID-19]), AMPLIFIED PROBE TECHNIQUE, MAKING USE OF HIGH THROUGHPUT TECHNOLOGIES AS DESCRIBED BY CMS-2020-01-R: HCPCS

## 2021-03-17 LAB — SARS-COV-2 RNA RESP QL NAA+PROBE: NOT DETECTED

## 2021-04-19 ENCOUNTER — LAB VISIT (OUTPATIENT)
Dept: PRIMARY CARE CLINIC | Facility: OTHER | Age: 41
End: 2021-04-19
Payer: MEDICAID

## 2021-04-19 DIAGNOSIS — Z20.822 ENCOUNTER FOR LABORATORY TESTING FOR COVID-19 VIRUS: ICD-10-CM

## 2021-04-19 PROCEDURE — U0003 INFECTIOUS AGENT DETECTION BY NUCLEIC ACID (DNA OR RNA); SEVERE ACUTE RESPIRATORY SYNDROME CORONAVIRUS 2 (SARS-COV-2) (CORONAVIRUS DISEASE [COVID-19]), AMPLIFIED PROBE TECHNIQUE, MAKING USE OF HIGH THROUGHPUT TECHNOLOGIES AS DESCRIBED BY CMS-2020-01-R: HCPCS | Performed by: INTERNAL MEDICINE

## 2021-04-20 LAB — SARS-COV-2 RNA RESP QL NAA+PROBE: NOT DETECTED

## 2021-05-17 ENCOUNTER — LAB VISIT (OUTPATIENT)
Dept: PRIMARY CARE CLINIC | Facility: OTHER | Age: 41
End: 2021-05-17
Payer: MEDICAID

## 2021-05-17 DIAGNOSIS — Z20.822 ENCOUNTER FOR LABORATORY TESTING FOR COVID-19 VIRUS: ICD-10-CM

## 2021-05-17 PROCEDURE — U0003 INFECTIOUS AGENT DETECTION BY NUCLEIC ACID (DNA OR RNA); SEVERE ACUTE RESPIRATORY SYNDROME CORONAVIRUS 2 (SARS-COV-2) (CORONAVIRUS DISEASE [COVID-19]), AMPLIFIED PROBE TECHNIQUE, MAKING USE OF HIGH THROUGHPUT TECHNOLOGIES AS DESCRIBED BY CMS-2020-01-R: HCPCS | Performed by: INTERNAL MEDICINE

## 2021-05-18 LAB — SARS-COV-2 RNA RESP QL NAA+PROBE: NOT DETECTED

## 2024-04-15 ENCOUNTER — HOSPITAL ENCOUNTER (INPATIENT)
Facility: OTHER | Age: 44
LOS: 1 days | Discharge: LEFT BEFORE DISCHARGE | DRG: 291 | End: 2024-04-17
Attending: EMERGENCY MEDICINE | Admitting: HOSPITALIST
Payer: COMMERCIAL

## 2024-04-15 DIAGNOSIS — R06.00 DYSPNEA: ICD-10-CM

## 2024-04-15 DIAGNOSIS — R79.89 ELEVATED BRAIN NATRIURETIC PEPTIDE (BNP) LEVEL: ICD-10-CM

## 2024-04-15 DIAGNOSIS — R45.1 PSYCHOMOTOR AGITATION: ICD-10-CM

## 2024-04-15 DIAGNOSIS — I50.9 ACUTE ON CHRONIC CONGESTIVE HEART FAILURE, UNSPECIFIED HEART FAILURE TYPE: Primary | ICD-10-CM

## 2024-04-15 LAB
ALBUMIN SERPL BCP-MCNC: 2.7 G/DL (ref 3.5–5.2)
ALP SERPL-CCNC: 87 U/L (ref 55–135)
ALT SERPL W/O P-5'-P-CCNC: 35 U/L (ref 10–44)
AMPHET+METHAMPHET UR QL: NEGATIVE
ANION GAP SERPL CALC-SCNC: 10 MMOL/L (ref 8–16)
APAP SERPL-MCNC: <3 UG/ML (ref 10–20)
AST SERPL-CCNC: 32 U/L (ref 10–40)
B-HCG UR QL: NEGATIVE
BACTERIA #/AREA URNS HPF: ABNORMAL /HPF
BARBITURATES UR QL SCN>200 NG/ML: NEGATIVE
BASOPHILS # BLD AUTO: 0.02 K/UL (ref 0–0.2)
BASOPHILS NFR BLD: 0.3 % (ref 0–1.9)
BENZODIAZ UR QL SCN>200 NG/ML: NEGATIVE
BILIRUB SERPL-MCNC: 0.4 MG/DL (ref 0.1–1)
BILIRUB UR QL STRIP: NEGATIVE
BNP SERPL-MCNC: 740 PG/ML (ref 0–99)
BUN SERPL-MCNC: 13 MG/DL (ref 6–20)
BZE UR QL SCN: ABNORMAL
CALCIUM SERPL-MCNC: 8.3 MG/DL (ref 8.7–10.5)
CANNABINOIDS UR QL SCN: ABNORMAL
CAOX CRY URNS QL MICRO: ABNORMAL
CHLORIDE SERPL-SCNC: 113 MMOL/L (ref 95–110)
CK SERPL-CCNC: 215 U/L (ref 20–180)
CLARITY UR: ABNORMAL
CO2 SERPL-SCNC: 20 MMOL/L (ref 23–29)
COLOR UR: YELLOW
CREAT SERPL-MCNC: 0.7 MG/DL (ref 0.5–1.4)
CREAT UR-MCNC: 132.4 MG/DL (ref 15–325)
CTP QC/QA: YES
D DIMER PPP IA.FEU-MCNC: 3.82 MG/L FEU
DIFFERENTIAL METHOD BLD: ABNORMAL
EOSINOPHIL # BLD AUTO: 0 K/UL (ref 0–0.5)
EOSINOPHIL NFR BLD: 0.2 % (ref 0–8)
ERYTHROCYTE [DISTWIDTH] IN BLOOD BY AUTOMATED COUNT: 13.7 % (ref 11.5–14.5)
EST. GFR  (NO RACE VARIABLE): >60 ML/MIN/1.73 M^2
ETHANOL SERPL-MCNC: <10 MG/DL
GLUCOSE SERPL-MCNC: 105 MG/DL (ref 70–110)
GLUCOSE UR QL STRIP: NEGATIVE
HCT VFR BLD AUTO: 44.8 % (ref 37–48.5)
HGB BLD-MCNC: 14.3 G/DL (ref 12–16)
HGB UR QL STRIP: NEGATIVE
HYALINE CASTS #/AREA URNS LPF: 0 /LPF
IMM GRANULOCYTES # BLD AUTO: 0.01 K/UL (ref 0–0.04)
IMM GRANULOCYTES NFR BLD AUTO: 0.2 % (ref 0–0.5)
KETONES UR QL STRIP: NEGATIVE
LEUKOCYTE ESTERASE UR QL STRIP: NEGATIVE
LYMPHOCYTES # BLD AUTO: 1.8 K/UL (ref 1–4.8)
LYMPHOCYTES NFR BLD: 29.9 % (ref 18–48)
MCH RBC QN AUTO: 26.4 PG (ref 27–31)
MCHC RBC AUTO-ENTMCNC: 31.9 G/DL (ref 32–36)
MCV RBC AUTO: 83 FL (ref 82–98)
METHADONE UR QL SCN>300 NG/ML: NEGATIVE
MICROSCOPIC COMMENT: ABNORMAL
MONOCYTES # BLD AUTO: 0.4 K/UL (ref 0.3–1)
MONOCYTES NFR BLD: 6.9 % (ref 4–15)
NEUTROPHILS # BLD AUTO: 3.8 K/UL (ref 1.8–7.7)
NEUTROPHILS NFR BLD: 62.5 % (ref 38–73)
NITRITE UR QL STRIP: NEGATIVE
NRBC BLD-RTO: 0 /100 WBC
OPIATES UR QL SCN: NEGATIVE
PCP UR QL SCN>25 NG/ML: NEGATIVE
PH UR STRIP: 7 [PH] (ref 5–8)
PLATELET # BLD AUTO: 231 K/UL (ref 150–450)
PMV BLD AUTO: 9.9 FL (ref 9.2–12.9)
POC MOLECULAR INFLUENZA A AGN: NEGATIVE
POC MOLECULAR INFLUENZA B AGN: NEGATIVE
POTASSIUM SERPL-SCNC: 3.8 MMOL/L (ref 3.5–5.1)
PROT SERPL-MCNC: 6.1 G/DL (ref 6–8.4)
PROT UR QL STRIP: ABNORMAL
RBC # BLD AUTO: 5.41 M/UL (ref 4–5.4)
RBC #/AREA URNS HPF: 3 /HPF (ref 0–4)
SALICYLATES SERPL-MCNC: <5 MG/DL (ref 15–30)
SARS-COV-2 RDRP RESP QL NAA+PROBE: NEGATIVE
SODIUM SERPL-SCNC: 143 MMOL/L (ref 136–145)
SP GR UR STRIP: 1.02 (ref 1–1.03)
SQUAMOUS #/AREA URNS HPF: 12 /HPF
TOXICOLOGY INFORMATION: ABNORMAL
TROPONIN I SERPL DL<=0.01 NG/ML-MCNC: 0.14 NG/ML (ref 0–0.03)
TSH SERPL DL<=0.005 MIU/L-ACNC: 0.68 UIU/ML (ref 0.4–4)
UNIDENT CRYS URNS QL MICRO: ABNORMAL
URN SPEC COLLECT METH UR: ABNORMAL
UROBILINOGEN UR STRIP-ACNC: ABNORMAL EU/DL
WBC # BLD AUTO: 6.13 K/UL (ref 3.9–12.7)
WBC #/AREA URNS HPF: 3 /HPF (ref 0–5)

## 2024-04-15 PROCEDURE — 63600175 PHARM REV CODE 636 W HCPCS: Performed by: EMERGENCY MEDICINE

## 2024-04-15 PROCEDURE — 80143 DRUG ASSAY ACETAMINOPHEN: CPT | Performed by: EMERGENCY MEDICINE

## 2024-04-15 PROCEDURE — 85025 COMPLETE CBC W/AUTO DIFF WBC: CPT | Performed by: EMERGENCY MEDICINE

## 2024-04-15 PROCEDURE — 80179 DRUG ASSAY SALICYLATE: CPT | Performed by: EMERGENCY MEDICINE

## 2024-04-15 PROCEDURE — 81000 URINALYSIS NONAUTO W/SCOPE: CPT | Mod: 59 | Performed by: EMERGENCY MEDICINE

## 2024-04-15 PROCEDURE — 93005 ELECTROCARDIOGRAM TRACING: CPT

## 2024-04-15 PROCEDURE — 99291 CRITICAL CARE FIRST HOUR: CPT

## 2024-04-15 PROCEDURE — 84484 ASSAY OF TROPONIN QUANT: CPT | Performed by: EMERGENCY MEDICINE

## 2024-04-15 PROCEDURE — 81025 URINE PREGNANCY TEST: CPT | Performed by: EMERGENCY MEDICINE

## 2024-04-15 PROCEDURE — 84443 ASSAY THYROID STIM HORMONE: CPT | Performed by: EMERGENCY MEDICINE

## 2024-04-15 PROCEDURE — 99285 EMERGENCY DEPT VISIT HI MDM: CPT | Mod: 25

## 2024-04-15 PROCEDURE — 25000003 PHARM REV CODE 250: Performed by: EMERGENCY MEDICINE

## 2024-04-15 PROCEDURE — 82077 ASSAY SPEC XCP UR&BREATH IA: CPT | Performed by: EMERGENCY MEDICINE

## 2024-04-15 PROCEDURE — 96376 TX/PRO/DX INJ SAME DRUG ADON: CPT

## 2024-04-15 PROCEDURE — 82550 ASSAY OF CK (CPK): CPT | Performed by: EMERGENCY MEDICINE

## 2024-04-15 PROCEDURE — 87635 SARS-COV-2 COVID-19 AMP PRB: CPT | Performed by: EMERGENCY MEDICINE

## 2024-04-15 PROCEDURE — 36415 COLL VENOUS BLD VENIPUNCTURE: CPT | Performed by: EMERGENCY MEDICINE

## 2024-04-15 PROCEDURE — 83880 ASSAY OF NATRIURETIC PEPTIDE: CPT | Performed by: EMERGENCY MEDICINE

## 2024-04-15 PROCEDURE — 80053 COMPREHEN METABOLIC PANEL: CPT | Performed by: EMERGENCY MEDICINE

## 2024-04-15 PROCEDURE — 96374 THER/PROPH/DIAG INJ IV PUSH: CPT | Mod: 59

## 2024-04-15 PROCEDURE — 96375 TX/PRO/DX INJ NEW DRUG ADDON: CPT

## 2024-04-15 PROCEDURE — 85379 FIBRIN DEGRADATION QUANT: CPT | Performed by: EMERGENCY MEDICINE

## 2024-04-15 PROCEDURE — 80307 DRUG TEST PRSMV CHEM ANLYZR: CPT | Performed by: EMERGENCY MEDICINE

## 2024-04-15 PROCEDURE — 93010 ELECTROCARDIOGRAM REPORT: CPT | Mod: ,,, | Performed by: INTERNAL MEDICINE

## 2024-04-15 RX ORDER — BUSPIRONE HYDROCHLORIDE 7.5 MG/1
15 TABLET ORAL 2 TIMES DAILY
Status: DISCONTINUED | OUTPATIENT
Start: 2024-04-15 | End: 2024-04-18 | Stop reason: HOSPADM

## 2024-04-15 RX ORDER — HALOPERIDOL 5 MG/ML
5 INJECTION INTRAMUSCULAR
Status: COMPLETED | OUTPATIENT
Start: 2024-04-15 | End: 2024-04-15

## 2024-04-15 RX ORDER — LORAZEPAM 2 MG/ML
2 INJECTION INTRAMUSCULAR
Status: COMPLETED | OUTPATIENT
Start: 2024-04-15 | End: 2024-04-15

## 2024-04-15 RX ORDER — FUROSEMIDE 10 MG/ML
80 INJECTION INTRAMUSCULAR; INTRAVENOUS
Status: COMPLETED | OUTPATIENT
Start: 2024-04-15 | End: 2024-04-15

## 2024-04-15 RX ORDER — LORAZEPAM 2 MG/ML
2 INJECTION INTRAMUSCULAR ONCE AS NEEDED
Status: COMPLETED | OUTPATIENT
Start: 2024-04-15 | End: 2024-04-15

## 2024-04-15 RX ORDER — HALOPERIDOL 5 MG/ML
5 INJECTION INTRAMUSCULAR ONCE AS NEEDED
Status: COMPLETED | OUTPATIENT
Start: 2024-04-15 | End: 2024-04-15

## 2024-04-15 RX ORDER — BUSPIRONE HYDROCHLORIDE 7.5 MG/1
15 TABLET ORAL 2 TIMES DAILY
Status: DISCONTINUED | OUTPATIENT
Start: 2024-04-15 | End: 2024-04-15

## 2024-04-15 RX ADMIN — HALOPERIDOL LACTATE 5 MG: 5 INJECTION, SOLUTION INTRAMUSCULAR at 08:04

## 2024-04-15 RX ADMIN — HALOPERIDOL LACTATE 5 MG: 5 INJECTION, SOLUTION INTRAMUSCULAR at 10:04

## 2024-04-15 RX ADMIN — LORAZEPAM 2 MG: 2 INJECTION INTRAMUSCULAR; INTRAVENOUS at 08:04

## 2024-04-15 RX ADMIN — LORAZEPAM 2 MG: 2 INJECTION INTRAMUSCULAR; INTRAVENOUS at 11:04

## 2024-04-15 RX ADMIN — BUSPIRONE HYDROCHLORIDE 15 MG: 7.5 TABLET ORAL at 07:04

## 2024-04-15 RX ADMIN — FUROSEMIDE 80 MG: 10 INJECTION, SOLUTION INTRAMUSCULAR; INTRAVENOUS at 11:04

## 2024-04-16 PROBLEM — J45.909 UNCOMPLICATED ASTHMA: Status: ACTIVE | Noted: 2022-06-01

## 2024-04-16 PROBLEM — R73.03 PREDIABETES: Status: ACTIVE | Noted: 2022-02-18

## 2024-04-16 PROBLEM — G47.33 OBSTRUCTIVE SLEEP APNEA: Status: ACTIVE | Noted: 2022-06-01

## 2024-04-16 PROBLEM — R79.89 ELEVATED BRAIN NATRIURETIC PEPTIDE (BNP) LEVEL: Status: ACTIVE | Noted: 2024-04-16

## 2024-04-16 PROBLEM — R79.89 ELEVATED D-DIMER: Status: ACTIVE | Noted: 2024-04-16

## 2024-04-16 PROBLEM — T50.905A DRUG-INDUCED DELIRIUM: Status: ACTIVE | Noted: 2024-04-16

## 2024-04-16 PROBLEM — R41.0 DRUG-INDUCED DELIRIUM: Status: ACTIVE | Noted: 2024-04-16

## 2024-04-16 PROBLEM — E66.01 MORBID OBESITY WITH BODY MASS INDEX OF 40.0-49.9: Status: ACTIVE | Noted: 2022-06-09

## 2024-04-16 LAB
ANION GAP SERPL CALC-SCNC: 14 MMOL/L (ref 8–16)
ASCENDING AORTA: 2.53 CM
AV INDEX (PROSTH): 0.61
AV MEAN GRADIENT: 3 MMHG
AV PEAK GRADIENT: 4 MMHG
AV VALVE AREA BY VELOCITY RATIO: 2.2 CM²
AV VALVE AREA: 2.04 CM²
AV VELOCITY RATIO: 0.65
BASOPHILS # BLD AUTO: 0.02 K/UL (ref 0–0.2)
BASOPHILS NFR BLD: 0.3 % (ref 0–1.9)
BSA FOR ECHO PROCEDURE: 2.1 M2
BUN SERPL-MCNC: 11 MG/DL (ref 6–20)
CALCIUM SERPL-MCNC: 9.8 MG/DL (ref 8.7–10.5)
CHLORIDE SERPL-SCNC: 104 MMOL/L (ref 95–110)
CO2 SERPL-SCNC: 27 MMOL/L (ref 23–29)
CREAT SERPL-MCNC: 0.8 MG/DL (ref 0.5–1.4)
CV ECHO LV RWT: 0.42 CM
DIFFERENTIAL METHOD BLD: ABNORMAL
DOP CALC AO PEAK VEL: 0.95 M/S
DOP CALC AO VTI: 17.8 CM
DOP CALC LVOT AREA: 3.4 CM2
DOP CALC LVOT DIAMETER: 2.07 CM
DOP CALC LVOT PEAK VEL: 0.62 M/S
DOP CALC LVOT STROKE VOLUME: 36.33 CM3
DOP CALC RVOT PEAK VEL: 0.48 M/S
DOP CALCLVOT PEAK VEL VTI: 10.8 CM
E WAVE DECELERATION TIME: 154.56 MSEC
E/A RATIO: 2.23
E/E' RATIO: 21 M/S
ECHO LV POSTERIOR WALL: 1.09 CM (ref 0.6–1.1)
EOSINOPHIL # BLD AUTO: 0 K/UL (ref 0–0.5)
EOSINOPHIL NFR BLD: 0.1 % (ref 0–8)
ERYTHROCYTE [DISTWIDTH] IN BLOOD BY AUTOMATED COUNT: 13.9 % (ref 11.5–14.5)
EST. GFR  (NO RACE VARIABLE): >60 ML/MIN/1.73 M^2
ESTIMATED AVG GLUCOSE: 131 MG/DL (ref 68–131)
FRACTIONAL SHORTENING: 24 % (ref 28–44)
GLOBAL LONGITUIDAL STRAIN: 6.6 %
GLUCOSE SERPL-MCNC: 101 MG/DL (ref 70–110)
HBA1C MFR BLD: 6.2 % (ref 4–5.6)
HCT VFR BLD AUTO: 52.5 % (ref 37–48.5)
HGB BLD-MCNC: 16.7 G/DL (ref 12–16)
IMM GRANULOCYTES # BLD AUTO: 0.02 K/UL (ref 0–0.04)
IMM GRANULOCYTES NFR BLD AUTO: 0.3 % (ref 0–0.5)
INTERVENTRICULAR SEPTUM: 0.9 CM (ref 0.6–1.1)
IVC DIAMETER: 1.8 CM
LA MAJOR: 6.46 CM
LA MINOR: 6.76 CM
LA WIDTH: 3.8 CM
LEFT ATRIUM SIZE: 3.99 CM
LEFT ATRIUM VOLUME INDEX MOD: 38.7 ML/M2
LEFT ATRIUM VOLUME INDEX: 42.4 ML/M2
LEFT ATRIUM VOLUME MOD: 77.84 CM3
LEFT ATRIUM VOLUME: 85.14 CM3
LEFT INTERNAL DIMENSION IN SYSTOLE: 3.91 CM (ref 2.1–4)
LEFT VENTRICLE DIASTOLIC VOLUME INDEX: 63.52 ML/M2
LEFT VENTRICLE DIASTOLIC VOLUME: 127.68 ML
LEFT VENTRICLE MASS INDEX: 95 G/M2
LEFT VENTRICLE SYSTOLIC VOLUME INDEX: 32.9 ML/M2
LEFT VENTRICLE SYSTOLIC VOLUME: 66.2 ML
LEFT VENTRICULAR INTERNAL DIMENSION IN DIASTOLE: 5.17 CM (ref 3.5–6)
LEFT VENTRICULAR MASS: 191.02 G
LV LATERAL E/E' RATIO: 21 M/S
LV SEPTAL E/E' RATIO: 21 M/S
LVOT MG: 0.74 MMHG
LVOT MV: 0.4 CM/S
LYMPHOCYTES # BLD AUTO: 1.6 K/UL (ref 1–4.8)
LYMPHOCYTES NFR BLD: 22.7 % (ref 18–48)
MAGNESIUM SERPL-MCNC: 1.8 MG/DL (ref 1.6–2.6)
MCH RBC QN AUTO: 26.4 PG (ref 27–31)
MCHC RBC AUTO-ENTMCNC: 31.8 G/DL (ref 32–36)
MCV RBC AUTO: 83 FL (ref 82–98)
MONOCYTES # BLD AUTO: 0.5 K/UL (ref 0.3–1)
MONOCYTES NFR BLD: 7.6 % (ref 4–15)
MV PEAK A VEL: 0.47 M/S
MV PEAK E VEL: 1.05 M/S
MV STENOSIS PRESSURE HALF TIME: 44.82 MS
MV VALVE AREA P 1/2 METHOD: 4.91 CM2
NEUTROPHILS # BLD AUTO: 4.7 K/UL (ref 1.8–7.7)
NEUTROPHILS NFR BLD: 69 % (ref 38–73)
NRBC BLD-RTO: 0 /100 WBC
OHS QRS DURATION: 66 MS
OHS QRS DURATION: 68 MS
OHS QTC CALCULATION: 448 MS
OHS QTC CALCULATION: 494 MS
PISA MRMAX VEL: 5.38 M/S
PISA TR MAX VEL: 2.66 M/S
PLATELET # BLD AUTO: 279 K/UL (ref 150–450)
PMV BLD AUTO: 9.8 FL (ref 9.2–12.9)
POCT GLUCOSE: 105 MG/DL (ref 70–110)
POCT GLUCOSE: 114 MG/DL (ref 70–110)
POCT GLUCOSE: 121 MG/DL (ref 70–110)
POCT GLUCOSE: 98 MG/DL (ref 70–110)
POTASSIUM SERPL-SCNC: 3.3 MMOL/L (ref 3.5–5.1)
PULM VEIN S/D RATIO: 1.07
PV PEAK D VEL: 0.44 M/S
PV PEAK GRADIENT: 2 MMHG
PV PEAK S VEL: 0.47 M/S
PV PEAK VELOCITY: 0.67 M/S
RA MAJOR: 5.97 CM
RA PRESSURE ESTIMATED: 3 MMHG
RA WIDTH: 3.5 CM
RBC # BLD AUTO: 6.33 M/UL (ref 4–5.4)
RV TB RVSP: 6 MMHG
RV TISSUE DOPPLER FREE WALL SYSTOLIC VELOCITY 1 (APICAL 4 CHAMBER VIEW): 11.29 CM/S
SINUS: 2.8 CM
SODIUM SERPL-SCNC: 145 MMOL/L (ref 136–145)
STJ: 2.63 CM
TDI LATERAL: 0.05 M/S
TDI SEPTAL: 0.05 M/S
TDI: 0.05 M/S
TR MAX PG: 28 MMHG
TR MEAN GRADIENT: 16 MMHG
TRICUSPID ANNULAR PLANE SYSTOLIC EXCURSION: 2.2 CM
TROPONIN I SERPL DL<=0.01 NG/ML-MCNC: 0.17 NG/ML (ref 0–0.03)
TV REST PULMONARY ARTERY PRESSURE: 31 MMHG
WBC # BLD AUTO: 6.83 K/UL (ref 3.9–12.7)
Z-SCORE OF LEFT VENTRICULAR DIMENSION IN END DIASTOLE: -1.3
Z-SCORE OF LEFT VENTRICULAR DIMENSION IN END SYSTOLE: 0.62

## 2024-04-16 PROCEDURE — 63600175 PHARM REV CODE 636 W HCPCS: Performed by: INTERNAL MEDICINE

## 2024-04-16 PROCEDURE — 96365 THER/PROPH/DIAG IV INF INIT: CPT | Mod: 59

## 2024-04-16 PROCEDURE — 94761 N-INVAS EAR/PLS OXIMETRY MLT: CPT

## 2024-04-16 PROCEDURE — 20000000 HC ICU ROOM

## 2024-04-16 PROCEDURE — 80048 BASIC METABOLIC PNL TOTAL CA: CPT | Performed by: PHYSICIAN ASSISTANT

## 2024-04-16 PROCEDURE — 96375 TX/PRO/DX INJ NEW DRUG ADDON: CPT

## 2024-04-16 PROCEDURE — 63600175 PHARM REV CODE 636 W HCPCS: Performed by: EMERGENCY MEDICINE

## 2024-04-16 PROCEDURE — 93005 ELECTROCARDIOGRAM TRACING: CPT

## 2024-04-16 PROCEDURE — A4216 STERILE WATER/SALINE, 10 ML: HCPCS | Performed by: PHYSICIAN ASSISTANT

## 2024-04-16 PROCEDURE — 63600175 PHARM REV CODE 636 W HCPCS: Performed by: HOSPITALIST

## 2024-04-16 PROCEDURE — 83735 ASSAY OF MAGNESIUM: CPT | Performed by: PHYSICIAN ASSISTANT

## 2024-04-16 PROCEDURE — 25000003 PHARM REV CODE 250: Performed by: EMERGENCY MEDICINE

## 2024-04-16 PROCEDURE — 99900035 HC TECH TIME PER 15 MIN (STAT)

## 2024-04-16 PROCEDURE — 84484 ASSAY OF TROPONIN QUANT: CPT | Performed by: PHYSICIAN ASSISTANT

## 2024-04-16 PROCEDURE — 96366 THER/PROPH/DIAG IV INF ADDON: CPT

## 2024-04-16 PROCEDURE — 25000003 PHARM REV CODE 250: Performed by: HOSPITALIST

## 2024-04-16 PROCEDURE — 96372 THER/PROPH/DIAG INJ SC/IM: CPT | Performed by: EMERGENCY MEDICINE

## 2024-04-16 PROCEDURE — 36415 COLL VENOUS BLD VENIPUNCTURE: CPT | Performed by: PHYSICIAN ASSISTANT

## 2024-04-16 PROCEDURE — 25000003 PHARM REV CODE 250: Performed by: INTERNAL MEDICINE

## 2024-04-16 PROCEDURE — 85025 COMPLETE CBC W/AUTO DIFF WBC: CPT | Performed by: PHYSICIAN ASSISTANT

## 2024-04-16 PROCEDURE — 27000221 HC OXYGEN, UP TO 24 HOURS

## 2024-04-16 PROCEDURE — 96376 TX/PRO/DX INJ SAME DRUG ADON: CPT

## 2024-04-16 PROCEDURE — 83036 HEMOGLOBIN GLYCOSYLATED A1C: CPT | Performed by: PHYSICIAN ASSISTANT

## 2024-04-16 PROCEDURE — 25000242 PHARM REV CODE 250 ALT 637 W/ HCPCS: Performed by: PHYSICIAN ASSISTANT

## 2024-04-16 PROCEDURE — 63600175 PHARM REV CODE 636 W HCPCS: Performed by: NURSE PRACTITIONER

## 2024-04-16 PROCEDURE — 25000003 PHARM REV CODE 250: Performed by: PHYSICIAN ASSISTANT

## 2024-04-16 PROCEDURE — 63600175 PHARM REV CODE 636 W HCPCS: Performed by: PHYSICIAN ASSISTANT

## 2024-04-16 PROCEDURE — 93010 ELECTROCARDIOGRAM REPORT: CPT | Mod: ,,, | Performed by: INTERNAL MEDICINE

## 2024-04-16 PROCEDURE — 94640 AIRWAY INHALATION TREATMENT: CPT

## 2024-04-16 RX ORDER — IPRATROPIUM BROMIDE AND ALBUTEROL SULFATE 2.5; .5 MG/3ML; MG/3ML
3 SOLUTION RESPIRATORY (INHALATION) EVERY 4 HOURS PRN
Status: DISCONTINUED | OUTPATIENT
Start: 2024-04-16 | End: 2024-04-18 | Stop reason: HOSPADM

## 2024-04-16 RX ORDER — OLANZAPINE 10 MG/2ML
5 INJECTION, POWDER, FOR SOLUTION INTRAMUSCULAR
Status: COMPLETED | OUTPATIENT
Start: 2024-04-16 | End: 2024-04-16

## 2024-04-16 RX ORDER — PREDNISONE 20 MG/1
40 TABLET ORAL DAILY
Status: DISCONTINUED | OUTPATIENT
Start: 2024-04-16 | End: 2024-04-18 | Stop reason: HOSPADM

## 2024-04-16 RX ORDER — HYDRALAZINE HYDROCHLORIDE 20 MG/ML
10 INJECTION INTRAMUSCULAR; INTRAVENOUS EVERY 4 HOURS PRN
Status: DISCONTINUED | OUTPATIENT
Start: 2024-04-16 | End: 2024-04-16

## 2024-04-16 RX ORDER — SODIUM CHLORIDE 0.9 % (FLUSH) 0.9 %
10 SYRINGE (ML) INJECTION
Status: DISCONTINUED | OUTPATIENT
Start: 2024-04-16 | End: 2024-04-18 | Stop reason: HOSPADM

## 2024-04-16 RX ORDER — INSULIN ASPART 100 [IU]/ML
0-5 INJECTION, SOLUTION INTRAVENOUS; SUBCUTANEOUS
Status: DISCONTINUED | OUTPATIENT
Start: 2024-04-16 | End: 2024-04-18 | Stop reason: HOSPADM

## 2024-04-16 RX ORDER — ACETAMINOPHEN 325 MG/1
650 TABLET ORAL EVERY 6 HOURS PRN
Status: DISCONTINUED | OUTPATIENT
Start: 2024-04-16 | End: 2024-04-18 | Stop reason: HOSPADM

## 2024-04-16 RX ORDER — FUROSEMIDE 10 MG/ML
20 INJECTION INTRAMUSCULAR; INTRAVENOUS 2 TIMES DAILY
Status: DISCONTINUED | OUTPATIENT
Start: 2024-04-16 | End: 2024-04-16

## 2024-04-16 RX ORDER — DEXMEDETOMIDINE HYDROCHLORIDE 4 UG/ML
0-1.4 INJECTION, SOLUTION INTRAVENOUS CONTINUOUS
Status: DISCONTINUED | OUTPATIENT
Start: 2024-04-16 | End: 2024-04-18 | Stop reason: HOSPADM

## 2024-04-16 RX ORDER — TALC
6 POWDER (GRAM) TOPICAL NIGHTLY PRN
Status: DISCONTINUED | OUTPATIENT
Start: 2024-04-16 | End: 2024-04-16 | Stop reason: SDUPTHER

## 2024-04-16 RX ORDER — HYDRALAZINE HYDROCHLORIDE 20 MG/ML
20 INJECTION INTRAMUSCULAR; INTRAVENOUS EVERY 4 HOURS PRN
Status: DISCONTINUED | OUTPATIENT
Start: 2024-04-16 | End: 2024-04-18 | Stop reason: HOSPADM

## 2024-04-16 RX ORDER — POTASSIUM CHLORIDE 7.45 MG/ML
10 INJECTION INTRAVENOUS
Status: COMPLETED | OUTPATIENT
Start: 2024-04-16 | End: 2024-04-16

## 2024-04-16 RX ORDER — ENOXAPARIN SODIUM 100 MG/ML
1 INJECTION SUBCUTANEOUS EVERY 12 HOURS
Status: DISCONTINUED | OUTPATIENT
Start: 2024-04-16 | End: 2024-04-17

## 2024-04-16 RX ORDER — AZITHROMYCIN 250 MG/1
500 TABLET, FILM COATED ORAL EVERY 24 HOURS
Status: DISCONTINUED | OUTPATIENT
Start: 2024-04-16 | End: 2024-04-16

## 2024-04-16 RX ORDER — POTASSIUM CHLORIDE 7.45 MG/ML
10 INJECTION INTRAVENOUS
Status: DISCONTINUED | OUTPATIENT
Start: 2024-04-16 | End: 2024-04-16

## 2024-04-16 RX ORDER — CHLORDIAZEPOXIDE HYDROCHLORIDE 25 MG/1
25 CAPSULE, GELATIN COATED ORAL EVERY 8 HOURS PRN
Status: DISCONTINUED | OUTPATIENT
Start: 2024-04-16 | End: 2024-04-18 | Stop reason: HOSPADM

## 2024-04-16 RX ORDER — FUROSEMIDE 10 MG/ML
20 INJECTION INTRAMUSCULAR; INTRAVENOUS 2 TIMES DAILY
Status: DISCONTINUED | OUTPATIENT
Start: 2024-04-16 | End: 2024-04-17

## 2024-04-16 RX ORDER — GLUCAGON 1 MG
1 KIT INJECTION
Status: DISCONTINUED | OUTPATIENT
Start: 2024-04-16 | End: 2024-04-18 | Stop reason: HOSPADM

## 2024-04-16 RX ORDER — IBUPROFEN 200 MG
24 TABLET ORAL
Status: DISCONTINUED | OUTPATIENT
Start: 2024-04-16 | End: 2024-04-18 | Stop reason: HOSPADM

## 2024-04-16 RX ORDER — MUPIROCIN 20 MG/G
OINTMENT TOPICAL 2 TIMES DAILY
Status: DISCONTINUED | OUTPATIENT
Start: 2024-04-16 | End: 2024-04-18 | Stop reason: HOSPADM

## 2024-04-16 RX ORDER — IPRATROPIUM BROMIDE AND ALBUTEROL SULFATE 2.5; .5 MG/3ML; MG/3ML
3 SOLUTION RESPIRATORY (INHALATION)
Status: DISCONTINUED | OUTPATIENT
Start: 2024-04-16 | End: 2024-04-18 | Stop reason: HOSPADM

## 2024-04-16 RX ORDER — AMOXICILLIN 250 MG
1 CAPSULE ORAL 2 TIMES DAILY PRN
Status: DISCONTINUED | OUTPATIENT
Start: 2024-04-16 | End: 2024-04-18 | Stop reason: HOSPADM

## 2024-04-16 RX ORDER — IBUPROFEN 200 MG
16 TABLET ORAL
Status: DISCONTINUED | OUTPATIENT
Start: 2024-04-16 | End: 2024-04-18 | Stop reason: HOSPADM

## 2024-04-16 RX ORDER — TALC
6 POWDER (GRAM) TOPICAL NIGHTLY PRN
Status: DISCONTINUED | OUTPATIENT
Start: 2024-04-16 | End: 2024-04-18 | Stop reason: HOSPADM

## 2024-04-16 RX ORDER — ONDANSETRON HYDROCHLORIDE 2 MG/ML
4 INJECTION, SOLUTION INTRAVENOUS EVERY 8 HOURS PRN
Status: DISCONTINUED | OUTPATIENT
Start: 2024-04-16 | End: 2024-04-18 | Stop reason: HOSPADM

## 2024-04-16 RX ORDER — NALOXONE HCL 0.4 MG/ML
0.02 VIAL (ML) INJECTION
Status: DISCONTINUED | OUTPATIENT
Start: 2024-04-16 | End: 2024-04-18 | Stop reason: HOSPADM

## 2024-04-16 RX ORDER — SODIUM CHLORIDE 0.9 % (FLUSH) 0.9 %
10 SYRINGE (ML) INJECTION EVERY 8 HOURS
Status: DISCONTINUED | OUTPATIENT
Start: 2024-04-16 | End: 2024-04-18 | Stop reason: HOSPADM

## 2024-04-16 RX ORDER — LORAZEPAM 2 MG/ML
2 INJECTION INTRAMUSCULAR
Status: COMPLETED | OUTPATIENT
Start: 2024-04-16 | End: 2024-04-16

## 2024-04-16 RX ADMIN — MUPIROCIN: 20 OINTMENT TOPICAL at 09:04

## 2024-04-16 RX ADMIN — POTASSIUM BICARBONATE 40 MEQ: 391 TABLET, EFFERVESCENT ORAL at 04:04

## 2024-04-16 RX ADMIN — DEXMEDETOMIDINE HYDROCHLORIDE 0.2 MCG/KG/HR: 4 INJECTION, SOLUTION INTRAVENOUS at 03:04

## 2024-04-16 RX ADMIN — POTASSIUM CHLORIDE 10 MEQ: 7.46 INJECTION, SOLUTION INTRAVENOUS at 09:04

## 2024-04-16 RX ADMIN — IPRATROPIUM BROMIDE AND ALBUTEROL SULFATE 3 ML: 2.5; .5 SOLUTION RESPIRATORY (INHALATION) at 12:04

## 2024-04-16 RX ADMIN — POTASSIUM CHLORIDE 10 MEQ: 7.46 INJECTION, SOLUTION INTRAVENOUS at 11:04

## 2024-04-16 RX ADMIN — FUROSEMIDE 20 MG: 10 INJECTION, SOLUTION INTRAMUSCULAR; INTRAVENOUS at 05:04

## 2024-04-16 RX ADMIN — ENOXAPARIN SODIUM 90 MG: 100 INJECTION SUBCUTANEOUS at 01:04

## 2024-04-16 RX ADMIN — IPRATROPIUM BROMIDE AND ALBUTEROL SULFATE 3 ML: 2.5; .5 SOLUTION RESPIRATORY (INHALATION) at 04:04

## 2024-04-16 RX ADMIN — AZITHROMYCIN MONOHYDRATE 500 MG: 500 INJECTION, POWDER, LYOPHILIZED, FOR SOLUTION INTRAVENOUS at 05:04

## 2024-04-16 RX ADMIN — DEXMEDETOMIDINE HYDROCHLORIDE 1 MCG/KG/HR: 4 INJECTION, SOLUTION INTRAVENOUS at 08:04

## 2024-04-16 RX ADMIN — IPRATROPIUM BROMIDE AND ALBUTEROL SULFATE 3 ML: 2.5; .5 SOLUTION RESPIRATORY (INHALATION) at 08:04

## 2024-04-16 RX ADMIN — LORAZEPAM 2 MG: 2 INJECTION INTRAMUSCULAR; INTRAVENOUS at 02:04

## 2024-04-16 RX ADMIN — HYDRALAZINE HYDROCHLORIDE 10 MG: 20 INJECTION INTRAMUSCULAR; INTRAVENOUS at 05:04

## 2024-04-16 RX ADMIN — Medication 10 ML: at 05:04

## 2024-04-16 RX ADMIN — ENOXAPARIN SODIUM 90 MG: 100 INJECTION SUBCUTANEOUS at 05:04

## 2024-04-16 RX ADMIN — FUROSEMIDE 20 MG: 10 INJECTION, SOLUTION INTRAMUSCULAR; INTRAVENOUS at 09:04

## 2024-04-16 RX ADMIN — BUSPIRONE HYDROCHLORIDE 15 MG: 7.5 TABLET ORAL at 09:04

## 2024-04-16 RX ADMIN — HYDRALAZINE HYDROCHLORIDE 20 MG: 20 INJECTION INTRAMUSCULAR; INTRAVENOUS at 09:04

## 2024-04-16 RX ADMIN — OLANZAPINE 5 MG: 10 INJECTION, POWDER, FOR SOLUTION INTRAMUSCULAR at 02:04

## 2024-04-16 RX ADMIN — DEXMEDETOMIDINE HYDROCHLORIDE 0.8 MCG/KG/HR: 4 INJECTION, SOLUTION INTRAVENOUS at 04:04

## 2024-04-16 RX ADMIN — DEXMEDETOMIDINE HYDROCHLORIDE 1 MCG/KG/HR: 4 INJECTION, SOLUTION INTRAVENOUS at 12:04

## 2024-04-16 RX ADMIN — POTASSIUM CHLORIDE 10 MEQ: 7.46 INJECTION, SOLUTION INTRAVENOUS at 05:04

## 2024-04-16 NOTE — ED NOTES
Patient is unable to keep still for EKG at this time and tech has had multiple failed attempts. Patient is extremely jittery and appears extremely anxious. Patient is very hostile and agitated.
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
## 2024-04-16 NOTE — ASSESSMENT & PLAN NOTE
- Patient's COPD is with exacerbation noted by continued dyspnea currently.  Patient is currently on COPD Pathway. Continue scheduled inhalers Steroids and Supplemental oxygen and monitor respiratory status closely.   - scheduled and PRN DuoNebs ordered   - prednisone ordered   - PRN supplemental O2 for sats >92%

## 2024-04-16 NOTE — ASSESSMENT & PLAN NOTE
- in the ED, she reported exertional shortness of breath and leg swelling   - she had an elevated D-dimer of 3.82   - she was too agitated to participate in CTA to r/o PE   - 1 mg/kg lovenox ordered   - attempt CTA PE study when agitation decreases   - US b/l LE veins ordered for AM

## 2024-04-16 NOTE — ASSESSMENT & PLAN NOTE
Chronic, uncontrolled. Latest blood pressure and vitals reviewed-     Temp:  [97 °F (36.1 °C)-99.9 °F (37.7 °C)]   Pulse:  []   Resp:  [12-66]   BP: (161-185)/()   SpO2:  [95 %-100 %] .   Home meds for hypertension were reviewed and noted below.   Hypertension Medications               lisinopril-hydrochlorothiazide (PRINZIDE,ZESTORETIC) 20-12.5 mg per tablet Take 1 tablet by mouth once daily.            While in the hospital, will manage blood pressure as follows; Continue home antihypertensive regimen    Will utilize p.r.n. blood pressure medication only if patient's blood pressure greater than 180/110 and she develops symptoms such as worsening chest pain or shortness of breath.

## 2024-04-16 NOTE — SUBJECTIVE & OBJECTIVE
Past Medical History:   Diagnosis Date    Bipolar 1 disorder     Depression     Hypertension     PTSD (post-traumatic stress disorder)     Schizophrenia        Past Surgical History:   Procedure Laterality Date     SECTION      HIP FUSION Left        Review of patient's allergies indicates:   Allergen Reactions    Compazine [prochlorperazine]     Geodon [ziprasidone hcl]        Current Facility-Administered Medications   Medication Dose Route Frequency Provider Last Rate Last Admin    acetaminophen tablet 650 mg  650 mg Oral Q6H PRN Nicole Vásquez PA-C        albuterol-ipratropium 2.5 mg-0.5 mg/3 mL nebulizer solution 3 mL  3 mL Nebulization Q4H PRN Nicole Vásquez PA-C        albuterol-ipratropium 2.5 mg-0.5 mg/3 mL nebulizer solution 3 mL  3 mL Nebulization Q4H WAKE Nicole Vásquez PA-C        azithromycin tablet 500 mg  500 mg Oral Daily Ac Henley MD        busPIRone tablet 15 mg  15 mg Oral BID Chemo Figueroa MD   15 mg at 04/15/24 1956    dexmedetomidine (PRECEDEX) 400mcg/100mL 0.9% NaCL infusion  0-1.4 mcg/kg/hr Intravenous Continuous Suzette Hester MD 4.74 mL/hr at 24 0307 0.2 mcg/kg/hr at 24 0307    dextrose 10% bolus 125 mL 125 mL  12.5 g Intravenous PRN Ac Henley MD        dextrose 10% bolus 250 mL 250 mL  25 g Intravenous PRN Ac Henley MD        enoxaparin injection 90 mg  1 mg/kg Subcutaneous Q12H (treatment, non-standard time) Chemo Figueroa MD        furosemide injection 20 mg  20 mg Intravenous BID Nicole Vásquez PA-C        glucagon (human recombinant) injection 1 mg  1 mg Intramuscular PRN Ac Henley MD        glucose chewable tablet 16 g  16 g Oral PRN Ac Henley MD        glucose chewable tablet 24 g  24 g Oral PRN Ac Henley MD        insulin aspart U-100 pen 0-5 Units  0-5 Units Subcutaneous QID (AC + HS) PRN Ac Henley MD        iohexoL (OMNIPAQUE 350) injection 100 mL  100 mL Intravenous ONCE PRN Chemo Figueroa  MD SANJEEV        melatonin tablet 6 mg  6 mg Oral Nightly PRN Chemo Figueroa MD        naloxone 0.4 mg/mL injection 0.02 mg  0.02 mg Intravenous PRN Nicole Vásquez PA-C        ondansetron injection 4 mg  4 mg Intravenous Q8H PRN Chemo Figueroa MD        predniSONE tablet 40 mg  40 mg Oral Daily Nicole Vásquez PA-C        senna-docusate 8.6-50 mg per tablet 1 tablet  1 tablet Oral BID PRN Nicole Vásquez PA-C        sodium chloride 0.9% flush 10 mL  10 mL Intravenous PRN Chemo Figueroa MD        sodium chloride 0.9% flush 10 mL  10 mL Intravenous Q8H Nicole Vásquez PA-C        sodium chloride 0.9% flush 10 mL  10 mL Intravenous PRN Nicole Vásquez PA-C         Family History    None       Tobacco Use    Smoking status: Every Day    Smokeless tobacco: Never   Substance and Sexual Activity    Alcohol use: No    Drug use: Yes     Types: Marijuana    Sexual activity: Not on file     Review of Systems   Unable to perform ROS: Psychiatric disorder     Objective:     Vital Signs (Most Recent):  Temp: 97 °F (36.1 °C) (04/16/24 0259)  Pulse: (!) 116 (04/16/24 0259)  Resp: (!) 24 (04/16/24 0259)  BP: (!) 169/104 (04/16/24 0259)  SpO2: 97 % (04/16/24 0300) Vital Signs (24h Range):  Temp:  [97 °F (36.1 °C)-99.9 °F (37.7 °C)] 97 °F (36.1 °C)  Pulse:  [] 116  Resp:  [12-66] 24  SpO2:  [95 %-100 %] 97 %  BP: (161-185)/() 169/104     Weight: 99.5 kg (219 lb 5.7 oz)  Body mass index is 38.86 kg/m².     Physical Exam  Vitals and nursing note reviewed.   Constitutional:       General: She is not in acute distress.     Appearance: She is well-developed. She is obese. She is not ill-appearing, toxic-appearing or diaphoretic.   HENT:      Head: Normocephalic and atraumatic.   Eyes:      General: No scleral icterus.        Right eye: No discharge.         Left eye: No discharge.      Conjunctiva/sclera: Conjunctivae normal.   Neck:      Trachea: No tracheal deviation.   Cardiovascular:      " Rate and Rhythm: Normal rate and regular rhythm.      Heart sounds: Normal heart sounds. No murmur heard.     No gallop.   Pulmonary:      Effort: Pulmonary effort is normal. No respiratory distress.      Breath sounds: Normal breath sounds. No stridor. No wheezing or rales.   Abdominal:      General: Bowel sounds are normal. There is no distension.      Palpations: Abdomen is soft. There is no mass.      Tenderness: There is no abdominal tenderness. There is no guarding.   Musculoskeletal:         General: No deformity. Normal range of motion.      Cervical back: Normal range of motion and neck supple.   Skin:     General: Skin is warm and dry.      Coloration: Skin is not pale.      Findings: No erythema or rash.   Neurological:      Mental Status: She is alert.      Motor: No abnormal muscle tone.                Significant Labs: All pertinent labs within the past 24 hours have been reviewed.  BMP:   Recent Labs   Lab 04/16/24  0416         K 3.3*      CO2 27   BUN 11   CREATININE 0.8   CALCIUM 9.8   MG 1.8     CBC:   Recent Labs   Lab 04/15/24  2056 04/16/24  0416   WBC 6.13 6.83   HGB 14.3 16.7*   HCT 44.8 52.5*    279     CMP:   Recent Labs   Lab 04/15/24  1952 04/16/24  0416    145   K 3.8 3.3*   * 104   CO2 20* 27    101   BUN 13 11   CREATININE 0.7 0.8   CALCIUM 8.3* 9.8   PROT 6.1  --    ALBUMIN 2.7*  --    BILITOT 0.4  --    ALKPHOS 87  --    AST 32  --    ALT 35  --    ANIONGAP 10 14     Urine Culture: No results for input(s): "LABURIN" in the last 48 hours.  Urine Studies:   Recent Labs   Lab 04/15/24  2033   COLORU Yellow   APPEARANCEUA Hazy*   PHUR 7.0   SPECGRAV 1.025   PROTEINUA 1+*   GLUCUA Negative   KETONESU Negative   BILIRUBINUA Negative   OCCULTUA Negative   NITRITE Negative   UROBILINOGEN 4.0-6.0*   LEUKOCYTESUR Negative   RBCUA 3   WBCUA 3   BACTERIA Rare   SQUAMEPITHEL 12   HYALINECASTS 0       Significant Imaging: I have reviewed all pertinent " imaging results/findings within the past 24 hours.  Imaging Results              X-Ray Chest 1 View (Final result)  Result time 04/15/24 21:37:20      Final result by Gail Ferrara MD (04/15/24 21:37:20)                   Impression:      Cardiomegaly.  Edema or infiltrates.  Recommend clinical correlation.  Question history of CHF.      Electronically signed by: Gail Ferrara  Date:    04/15/2024  Time:    21:37               Narrative:    EXAMINATION:  CHEST ONE VIEW    CLINICAL HISTORY:  Dyspnea, unspecified    TECHNIQUE:  One view of the chest.    COMPARISON:  None.    FINDINGS:  The cardiac silhouette is moderately enlarged.  There is patchy increased parenchymal and interstitial attenuation.  There is no pneumothorax or large pleural effusion.
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
## 2024-04-16 NOTE — ED TRIAGE NOTES
"Pt c/o SOB and anxiety, pt tearful and thrashing around in bed, "I already talked to too many people, I don't feel like telling you any more than that." Pt unable to cooperate with changing into gown or being placed on monitor, requesting IV anxiety medication, MD Figueroa notified.   "

## 2024-04-16 NOTE — ED NOTES
Pt unable to tolerate EKG due to pt movement, thrashing around, pt refusing to sit still, MD Figueroa notified, aware that unable to obtain EKG

## 2024-04-16 NOTE — ASSESSMENT & PLAN NOTE
- patient reports exertional shortness of breath and leg swelling   - BNP elevated at 740  - CXR w/ pulmonary edema   - s/p 80 mg IV lasix in ED   - CHF pathways initiated   - Echo ordered for AM
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
## 2024-04-16 NOTE — ASSESSMENT & PLAN NOTE
- Ms. Maki Amezquita presents with multiple symptoms and is found to be agitated and delirious   - she has h/o schizophrenia, Bipolar 1, depression   - she reported to ED MD that she had been out of her Buspar   - Utox positive for cocaine and marijuana   - s/p multiple doses of benzos and antipsychotics  - now on Precedex drip   - PEC filled out in ED
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
## 2024-04-16 NOTE — ED NOTES
Pt taken for CT scan, but per CT staff pt completely unable to lay flat, unable to tolerate exam, pt thrashing around, refusing to cooperate, pt brought back to room, MD Figueroa notified.

## 2024-04-16 NOTE — PLAN OF CARE
POC reviewed with patient and significant other, all questions and concerns answered, both need reinforcement.    Patient remains on 2L NC and tolerating well. Patients VSS throughout day shift. Patient remains on Precedex, nurse has tried to titrate down but patient gets anxious and restless. Patient has had good UOP but no BM.  For further assessment please see MAR and flowsheets.       Problem: Adult Inpatient Plan of Care  Goal: Plan of Care Review  Outcome: Ongoing, Progressing  Goal: Patient-Specific Goal (Individualized)  Outcome: Ongoing, Progressing  Goal: Absence of Hospital-Acquired Illness or Injury  Outcome: Ongoing, Progressing  Goal: Optimal Comfort and Wellbeing  Outcome: Ongoing, Progressing  Goal: Readiness for Transition of Care  Outcome: Ongoing, Progressing     Problem: Skin Injury Risk Increased  Goal: Skin Health and Integrity  Outcome: Ongoing, Progressing

## 2024-04-17 VITALS
BODY MASS INDEX: 38.87 KG/M2 | WEIGHT: 219.38 LBS | DIASTOLIC BLOOD PRESSURE: 66 MMHG | RESPIRATION RATE: 25 BRPM | HEIGHT: 63 IN | SYSTOLIC BLOOD PRESSURE: 141 MMHG | HEART RATE: 104 BPM | TEMPERATURE: 99 F | OXYGEN SATURATION: 94 %

## 2024-04-17 PROBLEM — I50.41 ACUTE COMBINED SYSTOLIC AND DIASTOLIC CONGESTIVE HEART FAILURE: Status: ACTIVE | Noted: 2024-04-16

## 2024-04-17 LAB
ANION GAP SERPL CALC-SCNC: 10 MMOL/L (ref 8–16)
ANION GAP SERPL CALC-SCNC: 11 MMOL/L (ref 8–16)
BASOPHILS # BLD AUTO: 0.02 K/UL (ref 0–0.2)
BASOPHILS NFR BLD: 0.3 % (ref 0–1.9)
BUN SERPL-MCNC: 11 MG/DL (ref 6–20)
BUN SERPL-MCNC: 11 MG/DL (ref 6–20)
CALCIUM SERPL-MCNC: 9.2 MG/DL (ref 8.7–10.5)
CALCIUM SERPL-MCNC: 9.2 MG/DL (ref 8.7–10.5)
CHLORIDE SERPL-SCNC: 104 MMOL/L (ref 95–110)
CHLORIDE SERPL-SCNC: 105 MMOL/L (ref 95–110)
CO2 SERPL-SCNC: 27 MMOL/L (ref 23–29)
CO2 SERPL-SCNC: 28 MMOL/L (ref 23–29)
CREAT SERPL-MCNC: 0.7 MG/DL (ref 0.5–1.4)
CREAT SERPL-MCNC: 0.8 MG/DL (ref 0.5–1.4)
DIFFERENTIAL METHOD BLD: ABNORMAL
EOSINOPHIL # BLD AUTO: 0 K/UL (ref 0–0.5)
EOSINOPHIL NFR BLD: 0 % (ref 0–8)
ERYTHROCYTE [DISTWIDTH] IN BLOOD BY AUTOMATED COUNT: 13.7 % (ref 11.5–14.5)
EST. GFR  (NO RACE VARIABLE): >60 ML/MIN/1.73 M^2
EST. GFR  (NO RACE VARIABLE): >60 ML/MIN/1.73 M^2
GLUCOSE SERPL-MCNC: 103 MG/DL (ref 70–110)
GLUCOSE SERPL-MCNC: 104 MG/DL (ref 70–110)
HCT VFR BLD AUTO: 47.8 % (ref 37–48.5)
HGB BLD-MCNC: 15.5 G/DL (ref 12–16)
IMM GRANULOCYTES # BLD AUTO: 0.01 K/UL (ref 0–0.04)
IMM GRANULOCYTES NFR BLD AUTO: 0.2 % (ref 0–0.5)
LYMPHOCYTES # BLD AUTO: 1.6 K/UL (ref 1–4.8)
LYMPHOCYTES NFR BLD: 23.7 % (ref 18–48)
MAGNESIUM SERPL-MCNC: 1.8 MG/DL (ref 1.6–2.6)
MCH RBC QN AUTO: 26.5 PG (ref 27–31)
MCHC RBC AUTO-ENTMCNC: 32.4 G/DL (ref 32–36)
MCV RBC AUTO: 82 FL (ref 82–98)
MONOCYTES # BLD AUTO: 0.5 K/UL (ref 0.3–1)
MONOCYTES NFR BLD: 8.1 % (ref 4–15)
NEUTROPHILS # BLD AUTO: 4.5 K/UL (ref 1.8–7.7)
NEUTROPHILS NFR BLD: 67.7 % (ref 38–73)
NRBC BLD-RTO: 0 /100 WBC
PLATELET # BLD AUTO: 277 K/UL (ref 150–450)
PMV BLD AUTO: 9.8 FL (ref 9.2–12.9)
POCT GLUCOSE: 104 MG/DL (ref 70–110)
POCT GLUCOSE: 164 MG/DL (ref 70–110)
POCT GLUCOSE: 183 MG/DL (ref 70–110)
POCT GLUCOSE: 96 MG/DL (ref 70–110)
POTASSIUM SERPL-SCNC: 3.9 MMOL/L (ref 3.5–5.1)
POTASSIUM SERPL-SCNC: 4 MMOL/L (ref 3.5–5.1)
RBC # BLD AUTO: 5.85 M/UL (ref 4–5.4)
SODIUM SERPL-SCNC: 142 MMOL/L (ref 136–145)
SODIUM SERPL-SCNC: 143 MMOL/L (ref 136–145)
WBC # BLD AUTO: 6.58 K/UL (ref 3.9–12.7)

## 2024-04-17 PROCEDURE — 85025 COMPLETE CBC W/AUTO DIFF WBC: CPT | Performed by: PHYSICIAN ASSISTANT

## 2024-04-17 PROCEDURE — 99900035 HC TECH TIME PER 15 MIN (STAT)

## 2024-04-17 PROCEDURE — 63600175 PHARM REV CODE 636 W HCPCS: Performed by: INTERNAL MEDICINE

## 2024-04-17 PROCEDURE — 94761 N-INVAS EAR/PLS OXIMETRY MLT: CPT

## 2024-04-17 PROCEDURE — 36415 COLL VENOUS BLD VENIPUNCTURE: CPT | Performed by: PHYSICIAN ASSISTANT

## 2024-04-17 PROCEDURE — 25000003 PHARM REV CODE 250: Performed by: INTERNAL MEDICINE

## 2024-04-17 PROCEDURE — A4216 STERILE WATER/SALINE, 10 ML: HCPCS | Performed by: PHYSICIAN ASSISTANT

## 2024-04-17 PROCEDURE — 63600175 PHARM REV CODE 636 W HCPCS: Performed by: PHYSICIAN ASSISTANT

## 2024-04-17 PROCEDURE — 94640 AIRWAY INHALATION TREATMENT: CPT

## 2024-04-17 PROCEDURE — 25000003 PHARM REV CODE 250: Performed by: HOSPITALIST

## 2024-04-17 PROCEDURE — 25000242 PHARM REV CODE 250 ALT 637 W/ HCPCS: Performed by: PHYSICIAN ASSISTANT

## 2024-04-17 PROCEDURE — 25000003 PHARM REV CODE 250: Performed by: PHYSICIAN ASSISTANT

## 2024-04-17 PROCEDURE — 20000000 HC ICU ROOM

## 2024-04-17 PROCEDURE — 25000003 PHARM REV CODE 250: Performed by: EMERGENCY MEDICINE

## 2024-04-17 PROCEDURE — 99223 1ST HOSP IP/OBS HIGH 75: CPT | Mod: ,,, | Performed by: INTERNAL MEDICINE

## 2024-04-17 PROCEDURE — 63600175 PHARM REV CODE 636 W HCPCS: Performed by: EMERGENCY MEDICINE

## 2024-04-17 PROCEDURE — 27000221 HC OXYGEN, UP TO 24 HOURS

## 2024-04-17 PROCEDURE — 80048 BASIC METABOLIC PNL TOTAL CA: CPT | Mod: 91 | Performed by: PHYSICIAN ASSISTANT

## 2024-04-17 PROCEDURE — 83735 ASSAY OF MAGNESIUM: CPT | Performed by: PHYSICIAN ASSISTANT

## 2024-04-17 RX ORDER — SPIRONOLACTONE 25 MG/1
25 TABLET ORAL DAILY
Status: DISCONTINUED | OUTPATIENT
Start: 2024-04-17 | End: 2024-04-18 | Stop reason: HOSPADM

## 2024-04-17 RX ORDER — LISINOPRIL 20 MG/1
20 TABLET ORAL DAILY
Status: DISCONTINUED | OUTPATIENT
Start: 2024-04-17 | End: 2024-04-17

## 2024-04-17 RX ORDER — ATORVASTATIN CALCIUM 20 MG/1
40 TABLET, FILM COATED ORAL DAILY
Status: DISCONTINUED | OUTPATIENT
Start: 2024-04-17 | End: 2024-04-18 | Stop reason: HOSPADM

## 2024-04-17 RX ORDER — LISINOPRIL 20 MG/1
20 TABLET ORAL EVERY 12 HOURS
Status: DISCONTINUED | OUTPATIENT
Start: 2024-04-17 | End: 2024-04-18 | Stop reason: HOSPADM

## 2024-04-17 RX ORDER — FUROSEMIDE 10 MG/ML
40 INJECTION INTRAMUSCULAR; INTRAVENOUS 2 TIMES DAILY
Status: DISCONTINUED | OUTPATIENT
Start: 2024-04-17 | End: 2024-04-18 | Stop reason: HOSPADM

## 2024-04-17 RX ORDER — ENOXAPARIN SODIUM 100 MG/ML
40 INJECTION SUBCUTANEOUS EVERY 24 HOURS
Status: DISCONTINUED | OUTPATIENT
Start: 2024-04-17 | End: 2024-04-17

## 2024-04-17 RX ADMIN — HYDRALAZINE HYDROCHLORIDE 20 MG: 20 INJECTION INTRAMUSCULAR; INTRAVENOUS at 06:04

## 2024-04-17 RX ADMIN — Medication 10 ML: at 03:04

## 2024-04-17 RX ADMIN — ATORVASTATIN CALCIUM 40 MG: 20 TABLET, FILM COATED ORAL at 01:04

## 2024-04-17 RX ADMIN — LISINOPRIL 20 MG: 20 TABLET ORAL at 09:04

## 2024-04-17 RX ADMIN — APIXABAN 10 MG: 2.5 TABLET, FILM COATED ORAL at 09:04

## 2024-04-17 RX ADMIN — HYDRALAZINE HYDROCHLORIDE 20 MG: 20 INJECTION INTRAMUSCULAR; INTRAVENOUS at 09:04

## 2024-04-17 RX ADMIN — DEXMEDETOMIDINE HYDROCHLORIDE 0.2 MCG/KG/HR: 4 INJECTION, SOLUTION INTRAVENOUS at 02:04

## 2024-04-17 RX ADMIN — IPRATROPIUM BROMIDE AND ALBUTEROL SULFATE 3 ML: 2.5; .5 SOLUTION RESPIRATORY (INHALATION) at 07:04

## 2024-04-17 RX ADMIN — ENOXAPARIN SODIUM 90 MG: 100 INJECTION SUBCUTANEOUS at 02:04

## 2024-04-17 RX ADMIN — MUPIROCIN: 20 OINTMENT TOPICAL at 09:04

## 2024-04-17 RX ADMIN — IPRATROPIUM BROMIDE AND ALBUTEROL SULFATE 3 ML: 2.5; .5 SOLUTION RESPIRATORY (INHALATION) at 11:04

## 2024-04-17 RX ADMIN — PREDNISONE 40 MG: 20 TABLET ORAL at 09:04

## 2024-04-17 RX ADMIN — Medication 10 ML: at 06:04

## 2024-04-17 RX ADMIN — SPIRONOLACTONE 25 MG: 25 TABLET, FILM COATED ORAL at 01:04

## 2024-04-17 RX ADMIN — FUROSEMIDE 40 MG: 10 INJECTION, SOLUTION INTRAMUSCULAR; INTRAVENOUS at 06:04

## 2024-04-17 RX ADMIN — BUSPIRONE HYDROCHLORIDE 15 MG: 7.5 TABLET ORAL at 09:04

## 2024-04-17 RX ADMIN — FUROSEMIDE 20 MG: 10 INJECTION, SOLUTION INTRAMUSCULAR; INTRAVENOUS at 09:04

## 2024-04-17 RX ADMIN — Medication 10 ML: at 09:04

## 2024-04-17 RX ADMIN — AZITHROMYCIN MONOHYDRATE 500 MG: 500 INJECTION, POWDER, LYOPHILIZED, FOR SOLUTION INTRAVENOUS at 06:04

## 2024-04-17 RX ADMIN — CHLORDIAZEPOXIDE HYDROCHLORIDE 25 MG: 25 CAPSULE ORAL at 07:04

## 2024-04-17 NOTE — ASSESSMENT & PLAN NOTE
- Takes lisinopril-HCTZ at home  - Restart lisinopril 20 mg daily and consider further titration of medications

## 2024-04-17 NOTE — ASSESSMENT & PLAN NOTE
- In the ED, she reported exertional shortness of breath and leg swelling   - Elevated D-dimer of 3.82   - US of legs negative for DVT  - Thrombus noted in left ventricle  - Stopped therapeutic dose of lovenox, and transitioned to apixaban  - No need for further workup

## 2024-04-17 NOTE — PT/OT/SLP PROGRESS
Physical Therapy  Not Seen    Patient Name:  Maki Amezquita   MRN:  [id number]    Patient not seen today secondary to Patient unwilling to participate. Will follow-up tomorrow, 4/18.

## 2024-04-17 NOTE — SUBJECTIVE & OBJECTIVE
Interval History: No acute events, mentation improved and patient less anxious. Precedex almost weaned off. She reported SOB and swelling started about 1 week ago, no previous symptom like this in the past. Reports feeling overall better.  at the bedside.    Review of Systems   Constitutional:  Negative for chills and fever.   Respiratory:  Positive for shortness of breath.    Cardiovascular:  Positive for leg swelling. Negative for chest pain.     Objective:     Vital Signs (Most Recent):  Temp: 98.4 °F (36.9 °C) (04/17/24 0730)  Pulse: 108 (04/17/24 1128)  Resp: 16 (04/17/24 1128)  BP: (!) 120/57 (04/17/24 1300)  SpO2: (!) 94 % (04/17/24 1128) Vital Signs (24h Range):  Temp:  [98.1 °F (36.7 °C)-98.8 °F (37.1 °C)] 98.4 °F (36.9 °C)  Pulse:  [] 108  Resp:  [16-43] 16  SpO2:  [92 %-99 %] 94 %  BP: (120-191)/() 120/57     Weight: 99.5 kg (219 lb 5.7 oz)  Body mass index is 38.86 kg/m².    Intake/Output Summary (Last 24 hours) at 4/17/2024 1451  Last data filed at 4/17/2024 1013  Gross per 24 hour   Intake 755.22 ml   Output 4450 ml   Net -3694.78 ml         Physical Exam  Vitals and nursing note reviewed.   Constitutional:       General: She is not in acute distress.     Appearance: She is well-developed. She is obese. She is not ill-appearing, toxic-appearing or diaphoretic.   HENT:      Head: Normocephalic and atraumatic.      Nose: Nose normal.   Eyes:      Extraocular Movements: Extraocular movements intact.      Conjunctiva/sclera: Conjunctivae normal.   Neck:      Trachea: No tracheal deviation.   Cardiovascular:      Rate and Rhythm: Normal rate and regular rhythm.      Heart sounds: Normal heart sounds. No murmur heard.     No gallop.   Pulmonary:      Effort: Pulmonary effort is normal. No respiratory distress.      Breath sounds: Normal breath sounds. No stridor. No wheezing or rales.      Comments: Diminished breath sounds at bases markedly improved  Abdominal:      General: Bowel sounds  are normal. There is no distension.      Palpations: Abdomen is soft. There is no mass.      Tenderness: There is no abdominal tenderness. There is no guarding.   Musculoskeletal:         General: No deformity. Normal range of motion.      Cervical back: Normal range of motion and neck supple.      Right lower leg: Edema present.      Left lower leg: Edema present.      Comments: 1+ edema to LE   Skin:     General: Skin is warm and dry.      Capillary Refill: Capillary refill takes less than 2 seconds.   Neurological:      Mental Status: She is alert and oriented to person, place, and time.      Motor: No abnormal muscle tone.             Significant Labs: All pertinent labs within the past 24 hours have been reviewed.    Significant Imaging: I have reviewed all pertinent imaging results/findings within the past 24 hours.

## 2024-04-17 NOTE — HOSPITAL COURSE
Ms. Amezquita presented with SOB/edema and agitation. Admitted with drug induced delirium with volume overload state with positive Utox for cocaine and MJ and high BNP and Ddimer. Treatment initiated with Precedex gtt and diuresis with IV lasix Q12 along with lovenox for anticoagulation. Mentation improved back to baseline and Precedex weaned off. Workup with Echo showed EF=35-40% and grade II diastolic dysfunction along with left ventricular thrombus. U/S of LE was negative for DVT. Cardiology consulted for new CHF. Anticoagulation changed to apixaban. Medications titrated up as per Cardiology to lisinopril 20 mg BID, spironolactone 25 mg daily, atorvastatin 40 mg daily   Patient was negative 8.8L during hospitalization and respiratory improved and was stable. Continued treatment and further workup was planned, along with involvement of  to address homelessness, but patient decided to leave the hospital. She was counseled on risks to her health if leaving the hospital AMA, but still elected to leave AMA and signed document.

## 2024-04-17 NOTE — ASSESSMENT & PLAN NOTE
Schizophrenia  Bipolar 1, depression   - Presented with multiple symptoms and is found to be agitated and delirious   - She has h/o schizophrenia, Bipolar 1, depression and she reported to ED MD that she had been out of her Buspar. Utox positive for cocaine and marijuana   - s/p multiple doses of benzos and antipsychotics  - On Precedex drip but mentation much improved and Precedex to be weaned off later today  - Added Librium 25 mg Q8 PRN  - Continue buspirone 15 mg p.o. b.I.d.  - Can likely step down to floor later today
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
## 2024-04-17 NOTE — PT/OT/SLP PROGRESS
Occupational Therapy      Patient Name:  Maki Amezquita   MRN:  [id number]    Patient not seen today secondary to Patient unwilling to participate. Will follow-up 4/18/24.    4/17/2024

## 2024-04-17 NOTE — CONSULTS
Nutrition consult acknowledge for fluid and sodium restriction diet education. Visited patient yesterday (4/16) and today after lunch, patient in deep sleep both times. Left handouts on bedside table for patient to review. RD to follow up at later time when patient is more awake.

## 2024-04-17 NOTE — NURSING
Patient and spouse at bedside has been educated on importance for diet and fluid restriction, medications and follow up appointments; demonstrated understanding.  No further questions at this time.

## 2024-04-17 NOTE — PLAN OF CARE
Pt seema txs with alejandrina.  Currently on room air with oxygen saturation within acceptable range.

## 2024-04-17 NOTE — ASSESSMENT & PLAN NOTE
- Patient's COPD and with wheezing noted on presentation   - Continue scheduled and PRN DuoNebs ordered   - Continue short course of prednisone and azithromycin  - PRN supplemental O2 for sats >92%

## 2024-04-17 NOTE — ASSESSMENT & PLAN NOTE
Elevated BNP  Left ventricle thrombus  - Patient reports exertional shortness of breath and leg swelling x 1 week  - BNP elevated at 740 and CXR with pulmonary edema   - s/p 80 mg IV lasix in ED, and diuresing well on lasix 40 mg IV Q12  - Strict I/Os and daily weight  - Echo with EF=35-40% and grade II diastolic dysfunction  - New diagnosis, will consult Cardiology  - Improving clinically, stable for step down to floor later today

## 2024-04-17 NOTE — ASSESSMENT & PLAN NOTE
- HgbA1C of 6.2 consistent with prediabetes  - Glucose levels in normal range  - Monitor with accuchecks and have SSI

## 2024-04-17 NOTE — CONSULTS
[facility]    Date of admission:  4/15/2024     Reason for Consult:    Heart failure    HPI:    This 43-year-old female came to the emergency department 2 days ago with a variety of complaints.  She is not interested in participating in a history.  Most of the history was obtained from the chart.  In the emergency department she reported a cough and dyspnea and wheezing and lower extremity edema.  Chest x-ray showed pulmonary edema.  BNP was elevated.  She was very agitated and required a Precedex infusion.  Since admission she has been treated with intravenous diuresis.  She appears comfortable.    Medications  Current Facility-Administered Medications   Medication Dose Route Frequency Provider Last Rate Last Admin    acetaminophen tablet 650 mg  650 mg Oral Q6H PRN Nicole Vásquez PA-C        albuterol-ipratropium 2.5 mg-0.5 mg/3 mL nebulizer solution 3 mL  3 mL Nebulization Q4H PRN Nicole Vásquez PA-C        albuterol-ipratropium 2.5 mg-0.5 mg/3 mL nebulizer solution 3 mL  3 mL Nebulization Q4H WAKE Nicole Vásquez PA-C   3 mL at 04/17/24 0758    azithromycin (ZITHROMAX) 500 mg in dextrose 5 % (D5W) 250 mL IVPB (Vial-Mate)  500 mg Intravenous Q24H Ac Henley MD   Stopped at 04/17/24 0727    busPIRone tablet 15 mg  15 mg Oral BID Chemo Figueroa MD   15 mg at 04/17/24 0957    chlordiazepoxide capsule 25 mg  25 mg Oral Q8H PRN Maureen Jenkins MD   25 mg at 04/17/24 0726    dexmedetomidine (PRECEDEX) 400mcg/100mL 0.9% NaCL infusion  0-1.4 mcg/kg/hr Intravenous Continuous Suzette Hester MD   Stopped at 04/17/24 0852    dextrose 10% bolus 125 mL 125 mL  12.5 g Intravenous PRN Ac Henley MD        dextrose 10% bolus 250 mL 250 mL  25 g Intravenous PRN Ac Henley MD        enoxaparin injection 90 mg  1 mg/kg Subcutaneous Q12H (treatment, non-standard time) Chemo Figueroa MD   90 mg at 04/17/24 0259    furosemide injection 20 mg  20 mg Intravenous BID [place], Nicole  KIRAN GAMBINO   20 mg at 04/17/24 0921    glucagon (human recombinant) injection 1 mg  1 mg Intramuscular PRN Ac Henley MD        glucose chewable tablet 16 g  16 g Oral PRN Ac Henley MD        glucose chewable tablet 24 g  24 g Oral PRN Ac Henley MD        hydrALAZINE injection 20 mg  20 mg Intravenous Q4H PRN Ac Henley MD   20 mg at 04/17/24 0954    insulin aspart U-100 pen 0-5 Units  0-5 Units Subcutaneous QID (AC + HS) PRN Ac Henley MD        iohexoL (OMNIPAQUE 350) injection 100 mL  100 mL Intravenous ONCE PRN Chemo Figueroa MD        lisinopriL tablet 20 mg  20 mg Oral Daily Maureen Jenkins MD   20 mg at 04/17/24 0957    melatonin tablet 6 mg  6 mg Oral Nightly PRN Chemo Figueroa MD        mupirocin 2 % ointment   Nasal BID Maureen Jenkins MD   Given at 04/17/24 0957    naloxone 0.4 mg/mL injection 0.02 mg  0.02 mg Intravenous PRN Nicole Vásquez PA-C        ondansetron injection 4 mg  4 mg Intravenous Q8H PRN Chemo Figueroa MD        predniSONE tablet 40 mg  40 mg Oral Daily Nicole Vásquez PA-C   40 mg at 04/17/24 0957    senna-docusate 8.6-50 mg per tablet 1 tablet  1 tablet Oral BID PRN Nicole Vásquez PA-C        sodium chloride 0.9% flush 10 mL  10 mL Intravenous PRN Chemo Figueroa MD        sodium chloride 0.9% flush 10 mL  10 mL Intravenous Q8H Nicole Vásquez PA-C   10 mL at 04/17/24 0623    sodium chloride 0.9% flush 10 mL  10 mL Intravenous PRN Nicole Vásquez PA-C          Prior to Admission medications    Medication Sig Start Date End Date Taking? Authorizing Provider   albuterol 90 mcg/actuation inhaler Inhale 1-2 puffs into the lungs every 6 (six) hours as needed for Wheezing. Rescue 6/16/18 6/16/19  Shayne Terrazas MD   chlordiazepoxide (LIBRIUM) 25 MG Cap Take 1 capsule (25 mg total) by mouth 3 (three) times daily. for 10 days 12/14/19 12/24/19  Bronwyn Pedraza MD   ibuprofen (ADVIL,MOTRIN) 400 MG tablet Take 1 tablet (400 mg  total) by mouth 3 (three) times daily as needed. 18   Shayne Terrazas MD   lisinopril-hydrochlorothiazide (PRINZIDE,ZESTORETIC) 20-12.5 mg per tablet Take 1 tablet by mouth once daily. 18   Shayne Terrazas MD   oxaprozin (DAYPRO) 600 mg tablet Take 1 tablet (600 mg total) by mouth once daily. 20   Rosanna Cotton, PA-C       History  Past Medical History:   Diagnosis Date    Bipolar 1 disorder     Depression     Hypertension     PTSD (post-traumatic stress disorder)     Schizophrenia      Past Surgical History:   Procedure Laterality Date     SECTION      HIP FUSION Left      Social History     Socioeconomic History    Marital status: Single   Tobacco Use    Smoking status: Every Day    Smokeless tobacco: Never   Substance and Sexual Activity    Alcohol use: No    Drug use: Yes     Types: Marijuana     Social Determinants of Health     Financial Resource Strain: High Risk (3/29/2023)    Received from [facility]    Overall Financial Resource Strain (CARDIA)     Difficulty of Paying Living Expenses: Hard   Food Insecurity: Food Insecurity Present (3/29/2023)    Received from [facility]    Hunger Vital Sign     Worried About Running Out of Food in the Last Year: Sometimes true     Ran Out of Food in the Last Year: Sometimes true   Transportation Needs: Unmet Transportation Needs (3/29/2023)    Received from [facility]    PRAPARE - Transportation     Lack of Transportation (Medical): Yes     Lack of Transportation (Non-Medical): Yes   Physical Activity: Sufficiently Active (3/29/2023)    Received from [facility]    Exercise Vital Sign     Days of Exercise per Week: 7 days     Minutes of Exercise per Session: 150+ min   Stress: Stress Concern Present (3/29/2023)    Received from [facility]    [place] [employer] - Occupational Stress Questionnaire     Feeling of Stress : To some extent   Social Connections: Moderately Integrated (3/29/2023)    Received from  [facility]    Social Connection and Isolation Panel [NHANES]     Frequency of Communication with Friends and Family: More than three times a week     Frequency of Social Gatherings with Friends and Family: More than three times a week     Attends [employer] Services: More than 4 times per year     Active Member of Clubs or Organizations: No     Attends Club or Organization Meetings: Never     Marital Status:      No family history on file.     Allergies  Review of patient's allergies indicates:   Allergen Reactions    Compazine [prochlorperazine]     Geodon [ziprasidone hcl]        Review of Systems   Review of Systems   Cardiovascular:  Positive for dyspnea on exertion and leg swelling.   Respiratory:  Positive for cough and shortness of breath.    All other systems reviewed and are negative.      Physical Exam    Temp:  [98.3 °F (36.8 °C)-98.6 °F (37 °C)]   Pulse:  []   Resp:  [20-35]   BP: (123-191)/(61-93)   SpO2:  [92 %-99 %]    Wt Readings from Last 1 Encounters:   04/16/24 99.5 kg (219 lb 5.7 oz)     Physical Exam  Constitutional:       General: She is not in acute distress.  Neck:      Vascular: No hepatojugular reflux or JVD.   Cardiovascular:      Rate and Rhythm: Regular rhythm. Tachycardia present.      Heart sounds: S1 normal and S2 normal. Murmur heard.      Systolic murmur is present.      Gallop present. S3 and S4 sounds present.   Pulmonary:      Effort: Pulmonary effort is normal.      Breath sounds: Normal breath sounds and air entry.   Abdominal:      General: Bowel sounds are normal.      Palpations: Abdomen is soft. There is no hepatomegaly.      Tenderness: There is abdominal tenderness in the right upper quadrant. There is no guarding or rebound.   Musculoskeletal:      Right lower leg: Edema present.      Left lower leg: Edema present.   Skin:     Coloration: Skin is not pale.   Neurological:      Mental Status: She is alert.         Telemetry  Sinus rhythm and sinus  tachycardia    EKG  Sinus tachycardia.  Biatrial enlargement.  Right axis deviation.  Likely biventricular hypertrophy.    Echocardiogram    Left Ventricle: Global hypokinesis present. There is moderately reduced systolic function with a visually estimated ejection fraction of 35 - 40%. Global longitudinal strain is -6.6%. Grade II diastolic dysfunction. There is a thrombus in the apex.    Right Ventricle: Normal right ventricular cavity size. Wall thickness is normal. Right ventricle wall motion  is normal. Systolic function is normal.    Left Atrium: Left atrium is moderately dilated.    Mitral Valve: There is mild regurgitation.    Pulmonic Valve: There is mild regurgitation.    Labs  Recent Results (from the past 72 hour(s))   POCT Influenza A/B Molecular    Collection Time: 04/15/24  7:42 PM   Result Value Ref Range    POC Molecular Influenza A Ag Negative Negative    POC Molecular Influenza B Ag Negative Negative     Acceptable Yes    POCT COVID-19 Rapid Screening    Collection Time: 04/15/24  7:42 PM   Result Value Ref Range    POC Rapid COVID Negative Negative     Acceptable Yes    Troponin I    Collection Time: 04/15/24  7:52 PM   Result Value Ref Range    Troponin I 0.144 (H) 0.000 - 0.026 ng/mL   Comprehensive metabolic panel    Collection Time: 04/15/24  7:52 PM   Result Value Ref Range    Sodium 143 136 - 145 mmol/L    Potassium 3.8 3.5 - 5.1 mmol/L    Chloride 113 (H) 95 - 110 mmol/L    CO2 20 (L) 23 - 29 mmol/L    Glucose 105 70 - 110 mg/dL    BUN 13 6 - 20 mg/dL    Creatinine 0.7 0.5 - 1.4 mg/dL    Calcium 8.3 (L) 8.7 - 10.5 mg/dL    Total Protein 6.1 6.0 - 8.4 g/dL    Albumin 2.7 (L) 3.5 - 5.2 g/dL    Total Bilirubin 0.4 0.1 - 1.0 mg/dL    Alkaline Phosphatase 87 55 - 135 U/L    AST 32 10 - 40 U/L    ALT 35 10 - 44 U/L    eGFR >60 >60 mL/min/1.73 m^2    Anion Gap 10 8 - 16 mmol/L   TSH    Collection Time: 04/15/24  7:52 PM   Result Value Ref Range    TSH 0.680 0.400 -  4.000 uIU/mL   Ethanol    Collection Time: 04/15/24  7:52 PM   Result Value Ref Range    Alcohol, Serum <10 <10 mg/dL   Acetaminophen level    Collection Time: 04/15/24  7:52 PM   Result Value Ref Range    Acetaminophen (Tylenol), Serum <3.0 (L) 10.0 - 20.0 ug/mL   Salicylate level    Collection Time: 04/15/24  7:52 PM   Result Value Ref Range    Salicylate Lvl <5.0 (L) 15.0 - 30.0 mg/dL   CK    Collection Time: 04/15/24  7:52 PM   Result Value Ref Range     (H) 20 - 180 U/L   BNP    Collection Time: 04/15/24  7:52 PM   Result Value Ref Range     (H) 0 - 99 pg/mL   Drug screen panel, emergency    Collection Time: 04/15/24  8:32 PM   Result Value Ref Range    Benzodiazepines Negative Negatiive    Methadone metabolites Negative Negative    Cocaine (Metab.) Presumptive Positive (A) Negative    Opiate Scrn, Ur Negative Negative    Barbiturate Screen, Ur Negative Negative    Amphetamine Screen, Ur Negative Negative    THC Presumptive Positive (A) Negative    Phencyclidine Negative Negative    Creatinine, Urine 132.4 15.0 - 325.0 mg/dL    Toxicology Information SEE COMMENT    Urinalysis, Reflex to Urine Culture Urine, Clean Catch    Collection Time: 04/15/24  8:33 PM    Specimen: Urine   Result Value Ref Range    Specimen UA Urine, Clean Catch     Color, UA Yellow Yellow, Straw, [place]    Appearance, UA Hazy (A) Clear    pH, UA 7.0 5.0 - 8.0    Specific Gravity, UA 1.025 1.005 - 1.030    Protein, UA 1+ (A) Negative    Glucose, UA Negative Negative    Ketones, UA Negative Negative    Bilirubin (UA) Negative Negative    Occult Blood UA Negative Negative    Nitrite, UA Negative Negative    Urobilinogen, UA 4.0-6.0 (A) <2.0 EU/dL    Leukocytes, UA Negative Negative   Urinalysis Microscopic    Collection Time: 04/15/24  8:33 PM   Result Value Ref Range    RBC, UA 3 0 - 4 /hpf    WBC, UA 3 0 - 5 /hpf    Bacteria Rare None-Occ /hpf    Squam Epithel, UA 12 /hpf    Hyaline Casts, UA 0 0-1/lpf /lpf    Ca Oxalate Rena,  UA Moderate None-Moderate    Unclass Rena UA Many (A) None-Moderate    Microscopic Comment SEE COMMENT    POCT urine pregnancy    Collection Time: 04/15/24  8:45 PM   Result Value Ref Range    POC Preg Test, Ur Negative Negative     Acceptable Yes    EKG 12-lead    Collection Time: 04/15/24  8:52 PM   Result Value Ref Range    QRS Duration 66 ms    [facility] QTC Calculation 448 ms   CBC Auto Differential    Collection Time: 04/15/24  8:56 PM   Result Value Ref Range    WBC 6.13 3.90 - 12.70 K/uL    RBC 5.41 (H) 4.00 - 5.40 M/uL    Hemoglobin 14.3 12.0 - 16.0 g/dL    Hematocrit 44.8 37.0 - 48.5 %    MCV 83 82 - 98 fL    MCH 26.4 (L) 27.0 - 31.0 pg    MCHC 31.9 (L) 32.0 - 36.0 g/dL    RDW 13.7 11.5 - 14.5 %    Platelets 231 150 - 450 K/uL    MPV 9.9 9.2 - 12.9 fL    Immature Granulocytes 0.2 0.0 - 0.5 %    Gran # (ANC) 3.8 1.8 - 7.7 K/uL    Immature Grans (Abs) 0.01 0.00 - 0.04 K/uL    Lymph # 1.8 1.0 - 4.8 K/uL    Mono # 0.4 0.3 - 1.0 K/uL    Eos # 0.0 0.0 - 0.5 K/uL    Baso # 0.02 0.00 - 0.20 K/uL    nRBC 0 0 /100 WBC    Gran % 62.5 38.0 - 73.0 %    Lymph % 29.9 18.0 - 48.0 %    Mono % 6.9 4.0 - 15.0 %    Eosinophil % 0.2 0.0 - 8.0 %    Basophil % 0.3 0.0 - 1.9 %    Differential Method Automated    D dimer, quantitative    Collection Time: 04/15/24  8:56 PM   Result Value Ref Range    D-Dimer 3.82 (H) <0.50 mg/L FEU   Troponin I    Collection Time: 04/16/24  3:21 AM   Result Value Ref Range    Troponin I 0.168 (H) 0.000 - 0.026 ng/mL   Basic Metabolic Panel (BMP)    Collection Time: 04/16/24  4:16 AM   Result Value Ref Range    Sodium 145 136 - 145 mmol/L    Potassium 3.3 (L) 3.5 - 5.1 mmol/L    Chloride 104 95 - 110 mmol/L    CO2 27 23 - 29 mmol/L    Glucose 101 70 - 110 mg/dL    BUN 11 6 - 20 mg/dL    Creatinine 0.8 0.5 - 1.4 mg/dL    Calcium 9.8 8.7 - 10.5 mg/dL    Anion Gap 14 8 - 16 mmol/L    eGFR >60 >60 mL/min/1.73 m^2   Magnesium    Collection Time: 04/16/24  4:16 AM   Result Value Ref Range     Magnesium 1.8 1.6 - 2.6 mg/dL   CBC with Automated Differential    Collection Time: 04/16/24  4:16 AM   Result Value Ref Range    WBC 6.83 3.90 - 12.70 K/uL    RBC 6.33 (H) 4.00 - 5.40 M/uL    Hemoglobin 16.7 (H) 12.0 - 16.0 g/dL    Hematocrit 52.5 (H) 37.0 - 48.5 %    MCV 83 82 - 98 fL    MCH 26.4 (L) 27.0 - 31.0 pg    MCHC 31.8 (L) 32.0 - 36.0 g/dL    RDW 13.9 11.5 - 14.5 %    Platelets 279 150 - 450 K/uL    MPV 9.8 9.2 - 12.9 fL    Immature Granulocytes 0.3 0.0 - 0.5 %    Gran # (ANC) 4.7 1.8 - 7.7 K/uL    Immature Grans (Abs) 0.02 0.00 - 0.04 K/uL    Lymph # 1.6 1.0 - 4.8 K/uL    Mono # 0.5 0.3 - 1.0 K/uL    Eos # 0.0 0.0 - 0.5 K/uL    Baso # 0.02 0.00 - 0.20 K/uL    nRBC 0 0 /100 WBC    Gran % 69.0 38.0 - 73.0 %    Lymph % 22.7 18.0 - 48.0 %    Mono % 7.6 4.0 - 15.0 %    Eosinophil % 0.1 0.0 - 8.0 %    Basophil % 0.3 0.0 - 1.9 %    Differential Method Automated    Hemoglobin A1c    Collection Time: 04/16/24  4:16 AM   Result Value Ref Range    Hemoglobin A1C 6.2 (H) 4.0 - 5.6 %    Estimated Avg Glucose 131 68 - 131 mg/dL   EKG 12-lead    Collection Time: 04/16/24  4:35 AM   Result Value Ref Range    QRS Duration 68 ms    [facility]HS QTC Calculation 494 ms   POCT glucose    Collection Time: 04/16/24  4:37 AM   Result Value Ref Range    POCT Glucose 105 70 - 110 mg/dL   POCT glucose    Collection Time: 04/16/24  8:00 AM   Result Value Ref Range    POCT Glucose 121 (H) 70 - 110 mg/dL   Echo    Collection Time: 04/16/24  9:39 AM   Result Value Ref Range    BSA 2.1 m2    LVOT stroke volume 36.33 cm3    LVIDd 5.17 3.5 - 6.0 cm    LV Systolic Volume 66.20 mL    LV Systolic Volume Index 32.9 mL/m2    LVIDs 3.91 2.1 - 4.0 cm    LV Diastolic Volume 127.68 mL    LV Diastolic Volume Index 63.52 mL/m2    IVS 0.90 0.6 - 1.1 cm    LVOT diameter 2.07 cm    LVOT area 3.4 cm2    FS 24 (A) 28 - 44 %    Left Ventricle Relative Wall Thickness 0.42 cm    Posterior Wall 1.09 0.6 - 1.1 cm    LV mass 191.02 g    LV Mass Index 95 g/m2    MV  Peak E Chano 1.05 m/s    TDI LATERAL 0.05 m/s    TDI SEPTAL 0.05 m/s    E/E' ratio 21.00 m/s    MV Peak A Chano 0.47 m/s    TR Max Chano 2.66 m/s    E/A ratio 2.23     E wave deceleration time 154.56 msec    LV SEPTAL E/E' RATIO 21.00 m/s    LV LATERAL E/E' RATIO 21.00 m/s    PV Peak S Chano 0.47 m/s    PV Peak D Chano 0.44 m/s    Pulm vein S/D ratio 1.07     LVOT peak chano 0.62 m/s    Left Ventricular Outflow Tract Mean Velocity 0.40 cm/s    Left Ventricular Outflow Tract Mean Gradient 0.74 mmHg    RV S' 11.29 cm/s    LA size 3.99 cm    Left Atrium Minor Axis 6.76 cm    Left Atrium Major Axis 6.46 cm    LA volume (mod) 77.84 cm3    LA Volume Index (Mod) 38.7 mL/m2    RA Major Axis 5.97 cm    AV mean gradient 3 mmHg    AV peak gradient 4 mmHg    Ao peak chano 0.95 m/s    Ao VTI 17.80 cm    LVOT peak VTI 10.80 cm    AV valve area 2.04 cm²    AV Velocity Ratio 0.65     AV index (prosthetic) 0.61     LAUREN by Velocity Ratio 2.20 cm²    Mr max chano 5.38 m/s    MV stenosis pressure 1/2 time 44.82 ms    MV valve area p 1/2 method 4.91 cm2    TV mean gradient 16 mmHg    Triscuspid Valve Regurgitation Peak Gradient 28 mmHg    PV PEAK VELOCITY 0.67 m/s    PV peak gradient 2 mmHg    RVOT peak chano 0.48 m/s    STJ 2.63 cm    Ascending aorta 2.53 cm    IVC diameter 1.80 cm    Mean e' 0.05 m/s    ZLVIDS 0.62     ZLVIDD -1.30     LA Volume Index 42.4 mL/m2    LA volume 85.14 cm3    TAPSE 2.20 cm    LA WIDTH 3.8 cm    RA Width 3.5 cm    Sinus 2.8 cm    TV resting pulmonary artery pressure 31 mmHg    RV TB RVSP 6 mmHg    Est. RA pres 3 mmHg    GLS 6.6 %   POCT glucose    Collection Time: 04/16/24  5:52 PM   Result Value Ref Range    POCT Glucose 98 70 - 110 mg/dL   POCT glucose    Collection Time: 04/16/24 10:35 PM   Result Value Ref Range    POCT Glucose 114 (H) 70 - 110 mg/dL   Basic Metabolic Panel (BMP)    Collection Time: 04/17/24  3:12 AM   Result Value Ref Range    Sodium 142 136 - 145 mmol/L    Potassium 3.9 3.5 - 5.1 mmol/L    Chloride 104  95 - 110 mmol/L    CO2 28 23 - 29 mmol/L    Glucose 104 70 - 110 mg/dL    BUN 11 6 - 20 mg/dL    Creatinine 0.8 0.5 - 1.4 mg/dL    Calcium 9.2 8.7 - 10.5 mg/dL    Anion Gap 10 8 - 16 mmol/L    eGFR >60 >60 mL/min/1.73 m^2   Magnesium    Collection Time: 04/17/24  3:12 AM   Result Value Ref Range    Magnesium 1.8 1.6 - 2.6 mg/dL   CBC with Automated Differential    Collection Time: 04/17/24  3:12 AM   Result Value Ref Range    WBC 6.58 3.90 - 12.70 K/uL    RBC 5.85 (H) 4.00 - 5.40 M/uL    Hemoglobin 15.5 12.0 - 16.0 g/dL    Hematocrit 47.8 37.0 - 48.5 %    MCV 82 82 - 98 fL    MCH 26.5 (L) 27.0 - 31.0 pg    MCHC 32.4 32.0 - 36.0 g/dL    RDW 13.7 11.5 - 14.5 %    Platelets 277 150 - 450 K/uL    MPV 9.8 9.2 - 12.9 fL    Immature Granulocytes 0.2 0.0 - 0.5 %    Gran # (ANC) 4.5 1.8 - 7.7 K/uL    Immature Grans (Abs) 0.01 0.00 - 0.04 K/uL    Lymph # 1.6 1.0 - 4.8 K/uL    Mono # 0.5 0.3 - 1.0 K/uL    Eos # 0.0 0.0 - 0.5 K/uL    Baso # 0.02 0.00 - 0.20 K/uL    nRBC 0 0 /100 WBC    Gran % 67.7 38.0 - 73.0 %    Lymph % 23.7 18.0 - 48.0 %    Mono % 8.1 4.0 - 15.0 %    Eosinophil % 0.0 0.0 - 8.0 %    Basophil % 0.3 0.0 - 1.9 %    Differential Method Automated    Basic metabolic panel    Collection Time: 04/17/24  3:12 AM   Result Value Ref Range    Sodium 143 136 - 145 mmol/L    Potassium 4.0 3.5 - 5.1 mmol/L    Chloride 105 95 - 110 mmol/L    CO2 27 23 - 29 mmol/L    Glucose 103 70 - 110 mg/dL    BUN 11 6 - 20 mg/dL    Creatinine 0.7 0.5 - 1.4 mg/dL    Calcium 9.2 8.7 - 10.5 mg/dL    Anion Gap 11 8 - 16 mmol/L    eGFR >60 >60 mL/min/1.73 m^2   POCT glucose    Collection Time: 04/17/24  9:18 AM   Result Value Ref Range    POCT Glucose 96 70 - 110 mg/dL        Imaging  Echo    Result Date: 4/16/2024    Left Ventricle: Global hypokinesis present. There is moderately reduced systolic function with a visually estimated ejection fraction of 35 - 40%. Global longitudinal strain is -6.6%. Grade II diastolic dysfunction. There is a  thrombus in the apex.   Right Ventricle: Normal right ventricular cavity size. Wall thickness is normal. Right ventricle wall motion  is normal. Systolic function is normal.   Left Atrium: Left atrium is moderately dilated.   Mitral Valve: There is mild regurgitation.   Pulmonic Valve: There is mild regurgitation.     US Lower Extremity Veins Bilateral    Result Date: 4/16/2024  EXAMINATION: US LOWER EXTREMITY VEINS BILATERAL CLINICAL HISTORY: elevated D-dimer; FINDINGS: All lower extremity veins demonstrate normal flow, compressibility, and no reflux.  No popliteal fossa mass, cyst, or is aneurysm seen.     No lower extremity DVT seen bilaterally. Electronically signed by: Victoriano Tineo MD Date:    04/16/2024 Time:    12:03    X-Ray Chest 1 View    Result Date: 4/15/2024  EXAMINATION: CHEST ONE VIEW CLINICAL HISTORY: Dyspnea, unspecified TECHNIQUE: One view of the chest. COMPARISON: None. FINDINGS: The cardiac silhouette is moderately enlarged.  There is patchy increased parenchymal and interstitial attenuation.  There is no pneumothorax or large pleural effusion.     Cardiomegaly.  Edema or infiltrates.  Recommend clinical correlation.  Question history of CHF. Electronically signed by: Gail Ferrara Date:    04/15/2024 Time:    21:37      Assessment    Acute systolic heart failure   Seems to have responded well to initial efforts at diuresis     Left ventricular thrombus   Has been started on anticoagulation    Plan and Discussion    Etiology of left ventricular dysfunction is uncertain.  But could be related to uncontrolled hypertension.  Ischemic etiology seems less likely.  Continue efforts at diuresis.  Up titrate antihypertensives and afterload reduction therapy.      Nael Whitaker MD

## 2024-04-17 NOTE — PLAN OF CARE
Patient AAOX3, independent at baseline. Homeless. Will need to establish care with PCP on discharge. Will need transportation to shelter.   04/17/24 5867   Discharge Assessment   Assessment Type Discharge Planning Assessment   Confirmed/corrected address, phone number and insurance Yes   Confirmed Demographics Correct on Facesheet   Source of Information patient   Communicated SUJATA with patient/caregiver Date not available/Unable to determine   People in Home   (Homeless)   Do you expect to return to your current living situation? Yes   Do you have help at home or someone to help you manage your care at home? Yes   Who are your caregiver(s) and their phone number(s)? Sahil Castro Spouse [phone]   Prior to hospitilization cognitive status: Alert/Oriented   Current cognitive status: Alert/Oriented   Walking or Climbing Stairs Difficulty no   Dressing/Bathing Difficulty no   Equipment Currently Used at Home none   Readmission within 30 days? No   Do you currently have service(s) that help you manage your care at home? No   Do you have prescription coverage? Yes   Do you have any problems affording any of your prescribed medications? Yes   Is the patient taking medications as prescribed? no   Who is going to help you get home at discharge? Sahil Castro Spouse [phone]   How do you get to doctors appointments? public transportation   Are you on dialysis? No   Do you take coumadin? No   Discharge Plan A Shelter   DME Needed Upon Discharge  none   Discharge Plan discussed with: Patient   Transition of Care Barriers Underinsured;Homeless;Substance Abuse;Transportation     [facility] ([place]  Initial Discharge Assessment       Primary Care Provider: No, Primary Doctor    Admission Diagnosis: Dyspnea [R06.00]  Psychomotor agitation [R45.1]  Acute on chronic congestive heart failure, unspecified heart failure type [I50.9]    Admission Date: 4/15/2024  Expected Discharge Date:     Transition  of Care Barriers: (P) Underinsured, Homeless, Substance Abuse, Transportation    Payor: GENERIC OUT OF STATE MEDICAID / Plan: [facility] COMMUNITY PLAN MEDICAID ([place] / Product Type: Managed Medicaid /     Extended Emergency Contact Information  Primary Emergency Contact: Arron Calle  Address: [address]           [place] [address] [place]  Mobile Phone: [phone]  Relation: Mother  Secondary Emergency Contact: Sahil Castro  Address: [address] [place] [address] [facility]  Home Phone: [phone]  Relation: Spouse  Preferred language: English   needed? No    Discharge Plan A: (P) Shelter       No Pharmacies Listed    Initial Assessment (most recent)       Adult Discharge Assessment - 04/17/24 5967          Discharge Assessment    Assessment Type Discharge Planning Assessment     Confirmed/corrected address, phone number and insurance Yes     Confirmed Demographics Correct on Facesheet     Source of Information patient     Communicated SUJATA with patient/caregiver Date not available/Unable to determine     People in Home --   Homeless    Do you expect to return to your current living situation? Yes     Do you have help at home or someone to help you manage your care at home? Yes (P)      Who are your caregiver(s) and their phone number(s)? Sahil Castro Spouse [phone] (P)      Prior to hospitilization cognitive status: Alert/Oriented (P)      Current cognitive status: Alert/Oriented (P)      Walking or Climbing Stairs Difficulty no (P)      Dressing/Bathing Difficulty no (P)      Equipment Currently Used at Home none (P)      Readmission within 30 days? No (P)      Do you currently have service(s) that help you manage your care at home? No (P)      Do you have prescription coverage? Yes (P)      Do you have any problems affording any of your prescribed medications? Yes (P)      Is the patient taking medications as  prescribed? no (P)      Who is going to help you get home at discharge? Sahil Castro Spouse [phone] (P)      How do you get to doctors appointments? public transportation (P)      Are you on dialysis? No (P)      Do you take coumadin? No (P)      Discharge Plan A Shelter (P)      DME Needed Upon Discharge  none (P)      Discharge Plan discussed with: Patient (P)      Transition of Care Barriers Underinsured;Homeless;Substance Abuse;Transportation (P)

## 2024-04-17 NOTE — ASSESSMENT & PLAN NOTE
Elevated BNP  - Patient reports exertional shortness of breath and leg swelling x 1 week  - BNP elevated at 740 and CXR with pulmonary edema   - s/p 80 mg IV lasix in ED, and diuresing well on lasix 40 mg IV Q12  - Strict I/Os and daily weight  - Echo with EF=35-40% and grade II diastolic dysfunction  - New diagnosis, will consult Cardiology  - Improving clinically, stable for step down to floor later today

## 2024-04-17 NOTE — ASSESSMENT & PLAN NOTE
- In the ED, she reported exertional shortness of breath and leg swelling   - Elevated D-dimer of 3.82   - US of legs negative for DVT  - Low suspicion for PE, respiratory issue due to CHF and patient markedly improved  - Stop therapeutic dose of lovenox  - No need for further workup

## 2024-04-18 PROBLEM — I24.0 ACUTE THROMBUS OF LEFT VENTRICLE: Status: ACTIVE | Noted: 2024-04-18

## 2024-04-18 NOTE — NURSING
"2200 Patient got upset, agitated stating "I want to leave now". Patient Aox4.  Nurse educated patient about her condition. Patient still wants to leave. Notified the Charge Nurse and the Hospitalist. Discussed the risk of leaving AMA. Patient verbalized understanding. Advised patient to return to ED for worsening of symptoms.      2230 AMA signed by the patient. Security called to escort patient. IV removed.  Patient escorted outside by the Security.   "

## 2024-04-19 LAB
OHS QRS DURATION: 68 MS
OHS QTC CALCULATION: 487 MS
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
## 2024-12-13 ENCOUNTER — HOSPITAL ENCOUNTER (EMERGENCY)
Facility: OTHER | Age: 44
Discharge: LEFT BEFORE DISCHARGE | End: 2024-12-13
Payer: MEDICAID

## 2024-12-13 VITALS
RESPIRATION RATE: 16 BRPM | DIASTOLIC BLOOD PRESSURE: 72 MMHG | TEMPERATURE: 98 F | BODY MASS INDEX: 38.98 KG/M2 | HEIGHT: 63 IN | HEART RATE: 101 BPM | WEIGHT: 220 LBS | SYSTOLIC BLOOD PRESSURE: 139 MMHG | OXYGEN SATURATION: 100 %

## 2024-12-13 PROCEDURE — 99900041 HC LEFT WITHOUT BEING SEEN- EMERGENCY

## 2024-12-14 ENCOUNTER — HOSPITAL ENCOUNTER (EMERGENCY)
Facility: OTHER | Age: 44
Discharge: HOME | End: 2024-12-14
Attending: EMERGENCY MEDICINE
Payer: MEDICAID

## 2024-12-14 VITALS
HEIGHT: 63 IN | WEIGHT: 220 LBS | RESPIRATION RATE: 14 BRPM | BODY MASS INDEX: 38.98 KG/M2 | DIASTOLIC BLOOD PRESSURE: 83 MMHG | SYSTOLIC BLOOD PRESSURE: 149 MMHG | OXYGEN SATURATION: 95 % | HEART RATE: 80 BPM

## 2024-12-14 DIAGNOSIS — R73.9 ELEVATED RANDOM BLOOD GLUCOSE LEVEL: ICD-10-CM

## 2024-12-14 DIAGNOSIS — T40.601A OPIATE OVERDOSE, ACCIDENTAL OR UNINTENTIONAL, INITIAL ENCOUNTER: Primary | ICD-10-CM

## 2024-12-14 LAB
ALBUMIN SERPL BCP-MCNC: 3.5 G/DL (ref 3.5–5.2)
ALP SERPL-CCNC: 81 U/L (ref 40–150)
ALT SERPL W/O P-5'-P-CCNC: 43 U/L (ref 10–44)
ANION GAP SERPL CALC-SCNC: 12 MMOL/L (ref 8–16)
AST SERPL-CCNC: 35 U/L (ref 10–40)
BASOPHILS # BLD AUTO: 0.01 K/UL (ref 0–0.2)
BASOPHILS NFR BLD: 0.2 % (ref 0–1.9)
BILIRUB SERPL-MCNC: 0.3 MG/DL (ref 0.1–1)
BUN SERPL-MCNC: 13 MG/DL (ref 6–20)
CALCIUM SERPL-MCNC: 8.5 MG/DL (ref 8.7–10.5)
CHLORIDE SERPL-SCNC: 109 MMOL/L (ref 95–110)
CO2 SERPL-SCNC: 16 MMOL/L (ref 23–29)
CREAT SERPL-MCNC: 1 MG/DL (ref 0.5–1.4)
DIFFERENTIAL METHOD BLD: ABNORMAL
EOSINOPHIL # BLD AUTO: 0 K/UL (ref 0–0.5)
EOSINOPHIL NFR BLD: 0.3 % (ref 0–8)
ERYTHROCYTE [DISTWIDTH] IN BLOOD BY AUTOMATED COUNT: 12.4 % (ref 11.5–14.5)
EST. GFR  (NO RACE VARIABLE): >60 ML/MIN/1.73 M^2
GLUCOSE SERPL-MCNC: 205 MG/DL (ref 70–110)
HCT VFR BLD AUTO: 47.1 % (ref 37–48.5)
HGB BLD-MCNC: 15.4 G/DL (ref 12–16)
IMM GRANULOCYTES # BLD AUTO: 0.05 K/UL (ref 0–0.04)
IMM GRANULOCYTES NFR BLD AUTO: 0.8 % (ref 0–0.5)
LYMPHOCYTES # BLD AUTO: 1.5 K/UL (ref 1–4.8)
LYMPHOCYTES NFR BLD: 22.3 % (ref 18–48)
MCH RBC QN AUTO: 28.9 PG (ref 27–31)
MCHC RBC AUTO-ENTMCNC: 32.7 G/DL (ref 32–36)
MCV RBC AUTO: 88 FL (ref 82–98)
MONOCYTES # BLD AUTO: 0.3 K/UL (ref 0.3–1)
MONOCYTES NFR BLD: 4.9 % (ref 4–15)
NEUTROPHILS # BLD AUTO: 4.7 K/UL (ref 1.8–7.7)
NEUTROPHILS NFR BLD: 71.5 % (ref 38–73)
NRBC BLD-RTO: 0 /100 WBC
PLATELET # BLD AUTO: 204 K/UL (ref 150–450)
PMV BLD AUTO: 10 FL (ref 9.2–12.9)
POTASSIUM SERPL-SCNC: 4.4 MMOL/L (ref 3.5–5.1)
PROT SERPL-MCNC: 6.9 G/DL (ref 6–8.4)
RBC # BLD AUTO: 5.33 M/UL (ref 4–5.4)
SODIUM SERPL-SCNC: 137 MMOL/L (ref 136–145)
WBC # BLD AUTO: 6.58 K/UL (ref 3.9–12.7)

## 2024-12-14 PROCEDURE — 63600175 PHARM REV CODE 636 W HCPCS: Performed by: EMERGENCY MEDICINE

## 2024-12-14 PROCEDURE — 99284 EMERGENCY DEPT VISIT MOD MDM: CPT | Mod: 25

## 2024-12-14 PROCEDURE — 85025 COMPLETE CBC W/AUTO DIFF WBC: CPT | Performed by: EMERGENCY MEDICINE

## 2024-12-14 PROCEDURE — 80053 COMPREHEN METABOLIC PANEL: CPT | Performed by: EMERGENCY MEDICINE

## 2024-12-14 PROCEDURE — 96374 THER/PROPH/DIAG INJ IV PUSH: CPT

## 2024-12-14 RX ORDER — NALOXONE HCL 0.4 MG/ML
0.4 VIAL (ML) INJECTION
Status: COMPLETED | OUTPATIENT
Start: 2024-12-14 | End: 2024-12-14

## 2024-12-14 RX ORDER — NALOXONE HYDROCHLORIDE 4 MG/.1ML
SPRAY NASAL
Qty: 1 EACH | Refills: 0 | Status: SHIPPED | OUTPATIENT
Start: 2024-12-14

## 2024-12-14 RX ADMIN — NALXONE HYDROCHLORIDE 0.4 MG: 0.4 INJECTION INTRAMUSCULAR; INTRAVENOUS; SUBCUTANEOUS at 04:12
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
## 2024-12-14 NOTE — ED TRIAGE NOTES
Arrived via EMS for an apparent overdose. Pt found down by friend, who reports patient has been spiraling since her father's death in the last week.

## 2024-12-15 NOTE — ED NOTES
"Patient refused to stay for discharge vital signs. She said, "I am feeling better I just want this IV out and want to leave". Discharge instructions given to patient.   "

## 2024-12-16 LAB
OHS QRS DURATION: 72 MS
OHS QTC CALCULATION: 495 MS

## 2024-12-22 ENCOUNTER — HOSPITAL ENCOUNTER (EMERGENCY)
Facility: OTHER | Age: 44
Discharge: HOME | End: 2024-12-22
Attending: EMERGENCY MEDICINE
Payer: MEDICAID

## 2024-12-22 VITALS
BODY MASS INDEX: 38.97 KG/M2 | DIASTOLIC BLOOD PRESSURE: 109 MMHG | RESPIRATION RATE: 21 BRPM | HEIGHT: 63 IN | TEMPERATURE: 98 F | SYSTOLIC BLOOD PRESSURE: 216 MMHG | HEART RATE: 66 BPM | OXYGEN SATURATION: 96 %

## 2024-12-22 DIAGNOSIS — R06.2 WHEEZING: ICD-10-CM

## 2024-12-22 DIAGNOSIS — J44.9 CHRONIC OBSTRUCTIVE PULMONARY DISEASE, UNSPECIFIED COPD TYPE: Primary | ICD-10-CM

## 2024-12-22 DIAGNOSIS — E87.5 HYPERKALEMIA: ICD-10-CM

## 2024-12-22 LAB
ALBUMIN SERPL BCP-MCNC: 3.7 G/DL (ref 3.5–5.2)
ALP SERPL-CCNC: 83 U/L (ref 40–150)
ALT SERPL W/O P-5'-P-CCNC: 32 U/L (ref 10–44)
ANION GAP SERPL CALC-SCNC: 11 MMOL/L (ref 8–16)
AST SERPL-CCNC: 39 U/L (ref 10–40)
B-HCG UR QL: NEGATIVE
BACTERIA #/AREA URNS HPF: NORMAL /HPF
BACTERIA GENITAL QL WET PREP: ABNORMAL
BASOPHILS # BLD AUTO: 0.02 K/UL (ref 0–0.2)
BASOPHILS NFR BLD: 0.4 % (ref 0–1.9)
BILIRUB SERPL-MCNC: 0.3 MG/DL (ref 0.1–1)
BILIRUB UR QL STRIP: NEGATIVE
BNP SERPL-MCNC: 125 PG/ML (ref 0–99)
BUN SERPL-MCNC: 12 MG/DL (ref 6–20)
CALCIUM SERPL-MCNC: 9.4 MG/DL (ref 8.7–10.5)
CHLORIDE SERPL-SCNC: 102 MMOL/L (ref 95–110)
CLARITY UR: CLEAR
CLUE CELLS VAG QL WET PREP: ABNORMAL
CO2 SERPL-SCNC: 25 MMOL/L (ref 23–29)
COLOR UR: COLORLESS
CREAT SERPL-MCNC: 0.9 MG/DL (ref 0.5–1.4)
CTP QC/QA: YES
DIFFERENTIAL METHOD BLD: ABNORMAL
EOSINOPHIL # BLD AUTO: 0.1 K/UL (ref 0–0.5)
EOSINOPHIL NFR BLD: 1 % (ref 0–8)
ERYTHROCYTE [DISTWIDTH] IN BLOOD BY AUTOMATED COUNT: 12.4 % (ref 11.5–14.5)
EST. GFR  (NO RACE VARIABLE): >60 ML/MIN/1.73 M^2
FILAMENT FUNGI VAG WET PREP-#/AREA: ABNORMAL
GLUCOSE SERPL-MCNC: 104 MG/DL (ref 70–110)
GLUCOSE UR QL STRIP: NEGATIVE
HCT VFR BLD AUTO: 50.1 % (ref 37–48.5)
HCV AB SERPL QL IA: POSITIVE
HGB BLD-MCNC: 16.6 G/DL (ref 12–16)
HGB UR QL STRIP: NEGATIVE
HIV 1+2 AB+HIV1 P24 AG SERPL QL IA: NEGATIVE
IMM GRANULOCYTES # BLD AUTO: 0.01 K/UL (ref 0–0.04)
IMM GRANULOCYTES NFR BLD AUTO: 0.2 % (ref 0–0.5)
KETONES UR QL STRIP: NEGATIVE
LEUKOCYTE ESTERASE UR QL STRIP: ABNORMAL
LYMPHOCYTES # BLD AUTO: 2.3 K/UL (ref 1–4.8)
LYMPHOCYTES NFR BLD: 45 % (ref 18–48)
MCH RBC QN AUTO: 28.4 PG (ref 27–31)
MCHC RBC AUTO-ENTMCNC: 33.1 G/DL (ref 32–36)
MCV RBC AUTO: 86 FL (ref 82–98)
MICROSCOPIC COMMENT: NORMAL
MONOCYTES # BLD AUTO: 0.3 K/UL (ref 0.3–1)
MONOCYTES NFR BLD: 5.6 % (ref 4–15)
NEUTROPHILS # BLD AUTO: 2.4 K/UL (ref 1.8–7.7)
NEUTROPHILS NFR BLD: 47.8 % (ref 38–73)
NITRITE UR QL STRIP: NEGATIVE
NRBC BLD-RTO: 0 /100 WBC
PH UR STRIP: 8 [PH] (ref 5–8)
PLATELET # BLD AUTO: 228 K/UL (ref 150–450)
PMV BLD AUTO: 10 FL (ref 9.2–12.9)
POC MOLECULAR INFLUENZA A AGN: NEGATIVE
POC MOLECULAR INFLUENZA B AGN: NEGATIVE
POTASSIUM SERPL-SCNC: 5.2 MMOL/L (ref 3.5–5.1)
PROT SERPL-MCNC: 7.9 G/DL (ref 6–8.4)
PROT UR QL STRIP: NEGATIVE
RBC # BLD AUTO: 5.84 M/UL (ref 4–5.4)
RBC #/AREA URNS HPF: 1 /HPF (ref 0–4)
SARS-COV-2 RDRP RESP QL NAA+PROBE: NEGATIVE
SODIUM SERPL-SCNC: 138 MMOL/L (ref 136–145)
SP GR UR STRIP: 1.01 (ref 1–1.03)
SPECIMEN SOURCE: ABNORMAL
SQUAMOUS #/AREA URNS HPF: 1 /HPF
T VAGINALIS GENITAL QL WET PREP: ABNORMAL
TROPONIN I SERPL DL<=0.01 NG/ML-MCNC: 0.05 NG/ML (ref 0–0.03)
URN SPEC COLLECT METH UR: ABNORMAL
UROBILINOGEN UR STRIP-ACNC: NEGATIVE EU/DL
WBC # BLD AUTO: 5.04 K/UL (ref 3.9–12.7)
WBC #/AREA URNS HPF: 1 /HPF (ref 0–5)
WBC #/AREA VAG WET PREP: ABNORMAL
YEAST GENITAL QL WET PREP: ABNORMAL

## 2024-12-22 PROCEDURE — 81025 URINE PREGNANCY TEST: CPT

## 2024-12-22 PROCEDURE — 85025 COMPLETE CBC W/AUTO DIFF WBC: CPT

## 2024-12-22 PROCEDURE — 87491 CHLMYD TRACH DNA AMP PROBE: CPT

## 2024-12-22 PROCEDURE — 87635 SARS-COV-2 COVID-19 AMP PRB: CPT

## 2024-12-22 PROCEDURE — 80053 COMPREHEN METABOLIC PANEL: CPT

## 2024-12-22 PROCEDURE — 84484 ASSAY OF TROPONIN QUANT: CPT

## 2024-12-22 PROCEDURE — 81000 URINALYSIS NONAUTO W/SCOPE: CPT

## 2024-12-22 PROCEDURE — 87389 HIV-1 AG W/HIV-1&-2 AB AG IA: CPT

## 2024-12-22 PROCEDURE — 99285 EMERGENCY DEPT VISIT HI MDM: CPT | Mod: 25

## 2024-12-22 PROCEDURE — 87210 SMEAR WET MOUNT SALINE/INK: CPT

## 2024-12-22 PROCEDURE — 94761 N-INVAS EAR/PLS OXIMETRY MLT: CPT

## 2024-12-22 PROCEDURE — 96372 THER/PROPH/DIAG INJ SC/IM: CPT

## 2024-12-22 PROCEDURE — 94640 AIRWAY INHALATION TREATMENT: CPT

## 2024-12-22 PROCEDURE — 25000242 PHARM REV CODE 250 ALT 637 W/ HCPCS

## 2024-12-22 PROCEDURE — 25000003 PHARM REV CODE 250

## 2024-12-22 PROCEDURE — 93005 ELECTROCARDIOGRAM TRACING: CPT

## 2024-12-22 PROCEDURE — 93010 ELECTROCARDIOGRAM REPORT: CPT | Mod: ,,, | Performed by: INTERNAL MEDICINE

## 2024-12-22 PROCEDURE — 86803 HEPATITIS C AB TEST: CPT

## 2024-12-22 PROCEDURE — 63600175 PHARM REV CODE 636 W HCPCS

## 2024-12-22 PROCEDURE — 83880 ASSAY OF NATRIURETIC PEPTIDE: CPT

## 2024-12-22 RX ORDER — HYDROCHLOROTHIAZIDE 12.5 MG/1
12.5 TABLET ORAL
Status: COMPLETED | OUTPATIENT
Start: 2024-12-22 | End: 2024-12-22

## 2024-12-22 RX ORDER — DEXAMETHASONE SODIUM PHOSPHATE 4 MG/ML
8 INJECTION, SOLUTION INTRA-ARTICULAR; INTRALESIONAL; INTRAMUSCULAR; INTRAVENOUS; SOFT TISSUE
Status: COMPLETED | OUTPATIENT
Start: 2024-12-22 | End: 2024-12-22

## 2024-12-22 RX ORDER — LEVALBUTEROL 1.25 MG/.5ML
1.25 SOLUTION, CONCENTRATE RESPIRATORY (INHALATION)
Status: COMPLETED | OUTPATIENT
Start: 2024-12-22 | End: 2024-12-22

## 2024-12-22 RX ORDER — IPRATROPIUM BROMIDE AND ALBUTEROL SULFATE 2.5; .5 MG/3ML; MG/3ML
3 SOLUTION RESPIRATORY (INHALATION)
Status: DISCONTINUED | OUTPATIENT
Start: 2024-12-22 | End: 2024-12-22

## 2024-12-22 RX ORDER — PREDNISONE 20 MG/1
40 TABLET ORAL DAILY
Qty: 10 TABLET | Refills: 0 | Status: SHIPPED | OUTPATIENT
Start: 2024-12-23 | End: 2024-12-28

## 2024-12-22 RX ORDER — ALBUTEROL SULFATE 90 UG/1
1-2 INHALANT RESPIRATORY (INHALATION) EVERY 6 HOURS PRN
Qty: 8 G | Refills: 0 | Status: SHIPPED | OUTPATIENT
Start: 2024-12-22 | End: 2025-01-21

## 2024-12-22 RX ORDER — LORAZEPAM 0.5 MG/1
0.5 TABLET ORAL
Status: COMPLETED | OUTPATIENT
Start: 2024-12-22 | End: 2024-12-22

## 2024-12-22 RX ORDER — METRONIDAZOLE 500 MG/1
500 TABLET ORAL
Status: COMPLETED | OUTPATIENT
Start: 2024-12-22 | End: 2024-12-22

## 2024-12-22 RX ORDER — CEFTRIAXONE 500 MG/1
500 INJECTION, POWDER, FOR SOLUTION INTRAMUSCULAR; INTRAVENOUS
Status: COMPLETED | OUTPATIENT
Start: 2024-12-22 | End: 2024-12-22

## 2024-12-22 RX ORDER — LISINOPRIL 10 MG/1
20 TABLET ORAL
Status: COMPLETED | OUTPATIENT
Start: 2024-12-22 | End: 2024-12-22

## 2024-12-22 RX ORDER — LEVALBUTEROL 1.25 MG/.5ML
1.25 SOLUTION, CONCENTRATE RESPIRATORY (INHALATION)
Status: ACTIVE | OUTPATIENT
Start: 2024-12-22 | End: 2024-12-22

## 2024-12-22 RX ADMIN — LEVALBUTEROL 1.25 MG: 1.25 SOLUTION, CONCENTRATE RESPIRATORY (INHALATION) at 03:12

## 2024-12-22 RX ADMIN — CEFTRIAXONE SODIUM 500 MG: 500 INJECTION, POWDER, FOR SOLUTION INTRAMUSCULAR; INTRAVENOUS at 03:12

## 2024-12-22 RX ADMIN — LEVALBUTEROL 1.25 MG: 1.25 SOLUTION, CONCENTRATE RESPIRATORY (INHALATION) at 02:12

## 2024-12-22 RX ADMIN — METRONIDAZOLE 500 MG: 500 TABLET ORAL at 04:12

## 2024-12-22 RX ADMIN — HYDROCHLOROTHIAZIDE 12.5 MG: 12.5 TABLET ORAL at 05:12

## 2024-12-22 RX ADMIN — DEXAMETHASONE SODIUM PHOSPHATE 8 MG: 4 INJECTION, SOLUTION INTRA-ARTICULAR; INTRALESIONAL; INTRAMUSCULAR; INTRAVENOUS; SOFT TISSUE at 03:12

## 2024-12-22 RX ADMIN — LISINOPRIL 20 MG: 10 TABLET ORAL at 05:12

## 2024-12-22 RX ADMIN — LORAZEPAM 0.5 MG: 0.5 TABLET ORAL at 03:12

## 2024-12-22 NOTE — LETTER
Patient: Maki Amezquita  YOB: 1980  Date: 12/22/2024 Time: 4:03 PM  Location: [facility]    Leaving the Hospital Against Medical Advice    Chart #:[id number]    This will certify that I, the undersigned,    ______________________________________________________________________    A patient in the above named [facility] center, having requested discharge and removal from the medical [place] against the advice of my attending physician(s), hereby release [facility]), its physicians, officers and employees, severally and individually, from any and all liability of any nature whatsoever for any injury or harm or complication of any kind that may result directly or indirectly, by reason of my terminating my stay as a patient at [facility] and my departure from [facility], and hereby waive any and all rights of action I may now have or later acquire as a result of my voluntary departure from [facility] and the termination of my stay as a patient therein.    This release is made with the full knowledge of the danger that may result from the action which I am taking.      Date:_______________________                         ___________________________                                                                                    Patient/Legal Representative    Witness:        ____________________________                          ___________________________  Nurse                                                                        Physician

## 2024-12-22 NOTE — ED NOTES
"Pt refusing any additional respiratory treatments secondary to " making her hyperventilate ".  Provider made aware  "

## 2024-12-22 NOTE — ED PROVIDER NOTES
Encounter Date: 2024       History     Chief Complaint   Patient presents with    Multiple Complaints     Sent from [facility] for shortness of breath, cough, congestion x 1 week. PMH asthma. Pt also c/o vaginal irritation and states her  gave her an STD. Pt refused to have her BP in triage.     Maki Amezquita is a 44 y.o. female with history of COPD, congestive heart failure, EF 35-40% in 2024, HTN, bipolar disorder, schizophrenia, PTSD, depression, presenting to the emergency department for evaluation of shortness of breath for a couple of days.  She reports associated congestion, productive cough, and wheezing that began a few days ago.  No relief with her nebulizer or albuterol inhaler.  States that she is currently residing at [facility] for rehabilitation from crack cocaine use.    Also reporting yellow vaginal discharge and vaginal irritation for a couple of days.  States that her  recently tested positive for gonorrhea and chlamydia.  She is requesting STD and HIV check.  She denies fever, chills, chest pain, palpitations, abdominal pain, nausea, vomiting, diarrhea, urinary symptoms, or abnormal vaginal bleeding.  She denies use of alcohol tobacco.  States that she has been clean since being at [facility].      The history is provided by the patient.     Review of patient's allergies indicates:   Allergen Reactions    Compazine [prochlorperazine]     Geodon [ziprasidone hcl]      Past Medical History:   Diagnosis Date    Bipolar 1 disorder     Depression     Hypertension     PTSD (post-traumatic stress disorder)     Schizophrenia      Past Surgical History:   Procedure Laterality Date     SECTION      HIP FUSION Left      No family history on file.  Social History     Tobacco Use    Smoking status: Every Day    Smokeless tobacco: Never   Substance Use Topics    Alcohol use: No    Drug use: Yes     Types: Marijuana     Review of Systems  As per HPI.    Physical  Exam     Initial Vitals [12/22/24 1411]   BP Pulse Resp Temp SpO2   -- 84 18 98.1 °F (36.7 °C) (!) 92 %      MAP       --         Physical Exam    Nursing note and vitals reviewed.  Constitutional: She appears well-developed and well-nourished.   Agitated.   HENT:   Head: Normocephalic and atraumatic.   Nose: Nose normal. Mouth/Throat: Oropharynx is clear and moist.   Eyes: Conjunctivae and EOM are normal.   Neck: Neck supple.   Normal range of motion.  Cardiovascular:  Normal rate, regular rhythm, normal heart sounds and intact distal pulses.           Pulmonary/Chest: She has wheezes. She has no rhonchi. She has no rales. She exhibits no tenderness.   Expiratory wheezing throughout all lung fields.  No accessory muscle usage.  Speaking in full sentences.   Abdominal: Abdomen is soft. Bowel sounds are normal. She exhibits no distension and no mass. There is no abdominal tenderness. There is no rebound and no guarding.   Musculoskeletal:         General: No edema. Normal range of motion.      Cervical back: Normal range of motion and neck supple.      Comments: No leg edema.     Neurological: She is alert and oriented to person, place, and time. She has normal strength.   Skin: Skin is warm and dry.   Psychiatric: She has a normal mood and affect. Her behavior is normal. Judgment and thought content normal.         ED Course   Procedures  Labs Reviewed   VAGINAL SCREEN - Abnormal       Result Value    Trichomonas Occasional (*)     Clue Cells None      Budding Yeast None      Fungal Hyphae None      WBC - Vaginal Screen Moderate (*)     Bacteria - Vaginal Screen Few (*)     Wet Prep Source Vagina      Narrative:     Release to patient->Immediate   URINALYSIS, REFLEX TO URINE CULTURE - Abnormal    Specimen UA Urine, Clean Catch      Color, UA Colorless (*)     Appearance, UA Clear      pH, UA 8.0      Specific Gravity, UA 1.015      Protein, UA Negative      Glucose, UA Negative      Ketones, UA Negative       Bilirubin (UA) Negative      Occult Blood UA Negative      Nitrite, UA Negative      Urobilinogen, UA Negative      Leukocytes, UA 2+ (*)     Narrative:     Specimen Source->Urine   CBC W/ AUTO DIFFERENTIAL - Abnormal    WBC 5.04      RBC 5.84 (*)     Hemoglobin 16.6 (*)     Hematocrit 50.1 (*)     MCV 86      MCH 28.4      MCHC 33.1      RDW 12.4      Platelets 228      MPV 10.0      Immature Granulocytes 0.2      Gran # (ANC) 2.4      Immature Grans (Abs) 0.01      Lymph # 2.3      Mono # 0.3      Eos # 0.1      Baso # 0.02      nRBC 0      Gran % 47.8      Lymph % 45.0      Mono % 5.6      Eosinophil % 1.0      Basophil % 0.4      Differential Method Automated     COMPREHENSIVE METABOLIC PANEL - Abnormal    Sodium 138      Potassium 5.2 (*)     Chloride 102      CO2 25      Glucose 104      BUN 12      Creatinine 0.9      Calcium 9.4      Total Protein 7.9      Albumin 3.7      Total Bilirubin 0.3      Alkaline Phosphatase 83      AST 39      ALT 32      eGFR >60      Anion Gap 11     B-TYPE NATRIURETIC PEPTIDE - Abnormal     (*)    TROPONIN I - Abnormal    Troponin I 0.051 (*)    POCT INFLUENZA A/B MOLECULAR - Abnormal    POC Molecular Influenza A Ag Negative      POC Molecular Influenza B Ag Negative       Acceptable Yes     URINALYSIS MICROSCOPIC    RBC, UA 1      WBC, UA 1      Bacteria Occasional      Squam Epithel, UA 1      Microscopic Comment SEE COMMENT      Narrative:     Specimen Source->Urine   HIV 1 / 2 ANTIBODY    HIV 1/2 Ag/Ab Negative      Narrative:     Release to patient->Immediate   C. TRACHOMATIS/N. GONORRHOEAE BY AMP DNA   HEPATITIS C ANTIBODY   SARS-COV-2 RDRP GENE    POC Rapid COVID Negative       Acceptable Yes     POCT URINE PREGNANCY    POC Preg Test, Ur Negative       Acceptable Yes       EKG Readings: (Independently Interpreted)   Initial Reading: No STEMI.   Normal sinus rhythm at a rate of 69.  Right axis deviation.  No peaked  T-waves.       Imaging Results              X-Ray Chest AP Portable (Final result)  Result time 12/22/24 15:46:10   Procedure changed from X-Ray Chest PA And Lateral     Final result by Dominick Mckeon MD (12/22/24 15:46:10)                   Impression:      1. Pulmonary findings suggest edema or other interstitial pneumonitis.  Correlation and follow-up is advised.      Electronically signed by: Dominick Mckeon MD  Date:    12/22/2024  Time:    15:46               Narrative:    EXAMINATION:  XR CHEST AP PORTABLE    CLINICAL HISTORY:  wheezing; Wheezing    TECHNIQUE:  Single frontal view of the chest was performed.    COMPARISON:  04/15/2024    FINDINGS:  The cardiomediastinal silhouette is prominent, magnified by technique..  There is no pleural effusion.  The trachea is midline.  The lungs are symmetrically expanded bilaterally with patchy increased interstitial and parenchymal attenuation bilaterally, less evident than on the previous exam..  There is no pneumothorax.  The osseous structures are remarkable for degenerative change..                                       Medications   levalbuterol nebulizer solution 1.25 mg (1.25 mg Nebulization Not Given 12/22/24 1610)   dexAMETHasone injection 8 mg (8 mg Intramuscular Given 12/22/24 1516)   levalbuterol nebulizer solution 1.25 mg (1.25 mg Nebulization Given 12/22/24 1504)   cefTRIAXone injection 500 mg (500 mg Intramuscular Given 12/22/24 1558)   LORazepam tablet 0.5 mg (0.5 mg Oral Given 12/22/24 1557)   metroNIDAZOLE tablet 500 mg (500 mg Oral Given 12/22/24 1600)     Medical Decision Making  Amount and/or Complexity of Data Reviewed  Labs: ordered.  Radiology: ordered.    Risk  Prescription drug management.                          Medical Decision Making:   Initial Assessment:   Urgent evaluation of 44-year-old female with history of COPD, congestive heart failure, EF 35-40% in April 2024, HTN, bipolar disorder, schizophrenia, PTSD, depression,  presenting with productive cough, nasal congestion, SOB and wheezing for a few days.  No relief of her symptoms with nebulizer or albuterol inhaler.  She denies recent use of steroids.  Denies fever or chills.  Also reporting yellow vaginal discharge and vaginal irritation for a few days.  States that has been tested positive for gonorrhea and chlamydia a few days ago.  On exam, she appears agitated.  Otherwise well-appearing and nontoxic.  No respiratory distress.  Speaking in full sentences.  No accessory muscle use.  Expiratory wheezing throughout all lung fields.  She is currently residing at [facility] for rehabilitation from crack cocaine use.  Plan for rapid swabs and chest x-ray.  Will give breathing treatments and steroids.  Clinical Tests:   Lab Tests: Ordered and Reviewed  Radiological Study: Ordered and Reviewed  Medical Tests: Ordered and Reviewed  ED Management:  Rapid COVID and influenza tests are both negative today.  Chest x-ray concerning for pulmonary edema versus pneumonitis.  She states that she is feeling better after breathing treatments.  On reauscultation of her lungs, she still has significant wheezing.  Oxygen saturation is 96% on room air.  Explained that I need to obtain labs to rule out CHF exacerbation.  Patient became upset and belligerent stating that she is ready to go.  I informed her that I can not medically cleared to return to [facility].  She is refusing additional breathing treatments at this time.  After further discussions, she finally agreed to labs.  On review of labs, no leukocytosis.  No anemia.  Normal platelet count.  On CMP, there is mild hyperkalemia at 5.2.  No EKG changes associated with hyperkalemia.  No other electrolyte derangement.  Normal renal and liver function.  Normal anion gap.  BNP elevated at 125.  She is not experiencing acute CHF exacerbation.  Troponin today is elevated at 0.051.  She denies any chest pain.  Per chart review, she does have  history of chronically elevated troponin likely secondary to CHF.  2+ leukocytes on UA.  No nitrites or blood.  Only 1 white blood cell on microscopy.  She denies any urinary symptoms.  Results are not consistent with UTI.  On wet mount, she is positive for Trichomonas.  She was given dose of Flagyl in the ED.  Also received prophylaxis treatment for assumed gonorrhea with dose of Rocephin in the ED. patient is adamant about discharge back to [facility].  She is saturating fine.  She is still speaking in full sentences.  No respiratory distress.  Per nurse, she is significantly hypertensive and states that she has not taken her blood pressure medications today.  She denies any symptoms associated with hypertensive urgency or emergency.  She was given her home dose of medications prior to discharge.  Advised her to continue taking her daily medications as prescribed.  Discharged with prescription for albuterol inhaler and few more days of steroids.  Patient verbalized understanding and agreement with this plan of care. She was given specific return precautions. Advised to follow up with PCP as needed. All questions and concerns addressed. She is stable for discharge.     This note was created with MModal Fluency Direct Dictation. Please excuse any spelling or grammatical errors.             Clinical Impression:  Final diagnoses:  [R06.2] Wheezing  [E87.5] Hyperkalemia  [J44.9] Chronic obstructive pulmonary disease, unspecified COPD type (Primary)          ED Disposition Condition    Discharge Stable          ED Prescriptions       Medication Sig Dispense Start Date End Date Auth. Provider    albuterol (PROVENTIL/VENTOLIN HFA) 90 mcg/actuation inhaler Inhale 1-2 puffs into the lungs every 6 (six) hours as needed for Wheezing or Shortness of Breath. Rescue 8 g 12/22/2024 1/21/2025 Jluis Velazquez PA-C    predniSONE (DELTASONE) 20 MG tablet Take 2 tablets (40 mg total) by mouth once daily. for 5 days 10 tablet  12/23/2024 12/28/2024 Jluis Velazquez PA-C          Follow-up Information    None          Jluis Velazquez PA-C  12/22/24 1949

## 2024-12-22 NOTE — DISCHARGE INSTRUCTIONS
Continue using your nebulizer and albuterol inhaler as needed for any shortness of breath or wheezing.  Start course of prednisone (steroids) tomorrow and finish.  You can take Mucinex or Robitussin DM as needed for your cough.

## 2024-12-22 NOTE — ED NOTES
Patient agitated, states she wants to leave and return to [facility]. Explained to patient that their policy is a patient must be medically cleared before they will accept them back from the ER. Patient agreed to stay and received blood work

## 2024-12-23 LAB
C TRACH DNA SPEC QL NAA+PROBE: NOT DETECTED
N GONORRHOEA DNA SPEC QL NAA+PROBE: NOT DETECTED
OHS QRS DURATION: 68 MS
OHS QTC CALCULATION: 422 MS

## 2025-01-09 ENCOUNTER — HOSPITAL ENCOUNTER (EMERGENCY)
Facility: OTHER | Age: 45
Discharge: HOME | End: 2025-01-10
Attending: EMERGENCY MEDICINE
Payer: MEDICAID

## 2025-01-09 VITALS
SYSTOLIC BLOOD PRESSURE: 130 MMHG | HEART RATE: 85 BPM | HEIGHT: 63 IN | WEIGHT: 205 LBS | RESPIRATION RATE: 17 BRPM | BODY MASS INDEX: 36.32 KG/M2 | DIASTOLIC BLOOD PRESSURE: 76 MMHG | TEMPERATURE: 98 F | OXYGEN SATURATION: 98 %

## 2025-01-09 DIAGNOSIS — D17.0 LIPOMA OF NECK: ICD-10-CM

## 2025-01-09 DIAGNOSIS — M25.551 RIGHT HIP PAIN: Primary | ICD-10-CM

## 2025-01-09 DIAGNOSIS — J10.1 INFLUENZA A: ICD-10-CM

## 2025-01-09 LAB
CTP QC/QA: YES
CTP QC/QA: YES
GROUP A STREP, MOLECULAR: NEGATIVE
POC MOLECULAR INFLUENZA A AGN: POSITIVE
POC MOLECULAR INFLUENZA B AGN: NEGATIVE
SARS-COV-2 RDRP RESP QL NAA+PROBE: NEGATIVE

## 2025-01-09 PROCEDURE — 63600175 PHARM REV CODE 636 W HCPCS: Performed by: EMERGENCY MEDICINE

## 2025-01-09 PROCEDURE — 99284 EMERGENCY DEPT VISIT MOD MDM: CPT | Mod: 25

## 2025-01-09 PROCEDURE — 87651 STREP A DNA AMP PROBE: CPT | Performed by: EMERGENCY MEDICINE

## 2025-01-09 PROCEDURE — 87635 SARS-COV-2 COVID-19 AMP PRB: CPT | Performed by: EMERGENCY MEDICINE

## 2025-01-09 PROCEDURE — 96372 THER/PROPH/DIAG INJ SC/IM: CPT | Performed by: EMERGENCY MEDICINE

## 2025-01-09 RX ORDER — BENZONATATE 100 MG/1
100 CAPSULE ORAL 3 TIMES DAILY PRN
Qty: 20 CAPSULE | Refills: 0 | Status: SHIPPED | OUTPATIENT
Start: 2025-01-09 | End: 2025-01-19

## 2025-01-09 RX ORDER — KETOROLAC TROMETHAMINE 30 MG/ML
30 INJECTION, SOLUTION INTRAMUSCULAR; INTRAVENOUS
Status: COMPLETED | OUTPATIENT
Start: 2025-01-09 | End: 2025-01-09

## 2025-01-09 RX ORDER — MELOXICAM 15 MG/1
15 TABLET ORAL DAILY
Qty: 30 TABLET | Refills: 0 | Status: SHIPPED | OUTPATIENT
Start: 2025-01-09

## 2025-01-09 RX ORDER — ALBUTEROL SULFATE 0.83 MG/ML
2.5 SOLUTION RESPIRATORY (INHALATION) EVERY 6 HOURS PRN
Qty: 30 EACH | Refills: 0 | Status: SHIPPED | OUTPATIENT
Start: 2025-01-09 | End: 2026-01-09

## 2025-01-09 RX ORDER — CYCLOBENZAPRINE HCL 5 MG
5 TABLET ORAL 3 TIMES DAILY PRN
Qty: 20 TABLET | Refills: 0 | Status: SHIPPED | OUTPATIENT
Start: 2025-01-09 | End: 2025-01-14

## 2025-01-09 RX ORDER — ONDANSETRON 4 MG/1
4 TABLET, ORALLY DISINTEGRATING ORAL EVERY 6 HOURS PRN
Qty: 15 TABLET | Refills: 0 | Status: SHIPPED | OUTPATIENT
Start: 2025-01-09

## 2025-01-09 RX ORDER — ORPHENADRINE CITRATE 30 MG/ML
60 INJECTION INTRAMUSCULAR; INTRAVENOUS
Status: COMPLETED | OUTPATIENT
Start: 2025-01-09 | End: 2025-01-09

## 2025-01-09 RX ADMIN — ORPHENADRINE CITRATE 60 MG: 60 INJECTION INTRAMUSCULAR; INTRAVENOUS at 11:01

## 2025-01-09 RX ADMIN — KETOROLAC TROMETHAMINE 30 MG: 30 INJECTION, SOLUTION INTRAMUSCULAR; INTRAVENOUS at 11:01

## 2025-01-10 NOTE — ED TRIAGE NOTES
"Maki Amezquita, a 44 y.o. female presents to the ED w/ complaint of cough. Endorses " productive cough, sore throat, headache, congestion, body aches, and sweating." Symptoms started x3 days ago. Denies s/s of CP, SOB, n/v, fever, diarrhea, LOC. AAOx3. NAD noted.     Triage note:  Chief Complaint   Patient presents with    Cough     Productive cough of clear sputum, sore throat, headache, nasal congestion, chills, body ache and sweating x 3 days, pt also c/o R hip pain     Review of patient's allergies indicates:   Allergen Reactions    Compazine [prochlorperazine]     Geodon [ziprasidone hcl]      Past Medical History:   Diagnosis Date    Bipolar 1 disorder     Depression     Hypertension     PTSD (post-traumatic stress disorder)     Schizophrenia       "

## 2025-01-10 NOTE — ED PROVIDER NOTES
Chief complaint:  Cough (Productive cough of clear sputum, sore throat, headache, nasal congestion, chills, body ache and sweating x 3 days, pt also c/o R hip pain)      Source of information:  Patient    HPI:  Maki Amezquita is a 44 y.o. female presenting with primary complaint to me of right hip pain which has been present for a long time, patient attributes it to her abnormal gait from having to have left hip surgery as a child resulting in shortened left leg.  However the right hip has started hurting her more over the past week.  No falls or trauma.  Reports she has not been taking medications for this, moved from [place] 3 or 4 months ago and does not have a primary care physician here.  Second complaint to me is a lump on her neck which has been getting gradually worse for the past 2 years, requesting that we remove it.  Additionally complains of cough nausea myalgias for the past few days.  No fevers.  No shortness of breath.  No other acute complaints    ROS: As per HPI    Review of patient's allergies indicates:   Allergen Reactions    Compazine [prochlorperazine]     Geodon [ziprasidone hcl]        No current facility-administered medications on file prior to encounter.     Current Outpatient Medications on File Prior to Encounter   Medication Sig Dispense Refill    albuterol (PROVENTIL/VENTOLIN HFA) 90 mcg/actuation inhaler Inhale 1-2 puffs into the lungs every 6 (six) hours as needed for Wheezing or Shortness of Breath. Rescue 8 g 0    chlordiazepoxide (LIBRIUM) 25 MG Cap Take 1 capsule (25 mg total) by mouth 3 (three) times daily. for 10 days 30 capsule 0    ibuprofen (ADVIL,MOTRIN) 400 MG tablet Take 1 tablet (400 mg total) by mouth 3 (three) times daily as needed. 20 tablet 0    lisinopril-hydrochlorothiazide (PRINZIDE,ZESTORETIC) 20-12.5 mg per tablet Take 1 tablet by mouth once daily. 30 tablet 0    naloxone (NARCAN) 4 mg/actuation Spry 4mg by nasal route as needed for opioid overdose; may repeat  every 2-3 minutes in alternating nostrils until medical help arrives. Call 911 1 each 0    [DISCONTINUED] oxaprozin (DAYPRO) 600 mg tablet Take 1 tablet (600 mg total) by mouth once daily. 10 tablet 0       PMH:  As per HPI and below:  Past Medical History:   Diagnosis Date    Bipolar 1 disorder     Depression     Hypertension     PTSD (post-traumatic stress disorder)     Schizophrenia      Past Surgical History:   Procedure Laterality Date     SECTION      HIP FUSION Left          Physical Exam:    Vitals:    25 2110   BP: 128/71   Pulse: 86   Resp: 15   Temp: 97.4 °F (36.3 °C)       General: No acute distress. Well developed. Well nourished.  Eyes: PERRL. EOM intact. no photophobia, no nystagmus  Conjunctivae - no pallor or icterus.   ENT: HEAD: Normal - atraumatic. Normal external ears. Normal nose.  No facial asymmetry. Mucous membranes - moist.  Neck: Neck supple. no meningismus.   No JVD.  Cardiovascular: Regular rate and rhythm. Normal S1 and S2. No murmur. No gallop. No rub.  2+ peripheral pulses  Respiratory: Normal breath sounds. No rales. No rhonchi. No wheezes. No tachypnea with exertion.  Musculoskeletal:  Pain with palpation diffusely around the right hip, and with range of motion, but is able to perform full range of motion.  Intact distal strength and sensation, no focal bony tenderness of left hip, knees or ankles.  No distal edema.  Integument:  3 cm rubbery roughly circular subcutaneous mass in the right lateral neck which is suggestive of lipoma.  No overlying skin changes.  Neurologic: No gross neurological deficits.    Psychiatric: Awake, alert.  Oriented x3.  Normal speech and mentation.        Labs Reviewed   POCT INFLUENZA A/B MOLECULAR - Abnormal       Result Value    POC Molecular Influenza A Ag Positive (*)     POC Molecular Influenza B Ag Negative       Acceptable Yes     GROUP A STREP, MOLECULAR    Group A Strep, Molecular Negative     SARS-COV-2 RDRP GENE     POC Rapid COVID Negative       Acceptable Yes         Medications   ketorolac injection 30 mg (has no administration in time range)   orphenadrine injection 60 mg (has no administration in time range)     Medical Decision Making  Differential diagnosis includes arthritis of right hip, lipoma, viral syndrome, COVID, influenza    Amount and/or Complexity of Data Reviewed  Labs: ordered. Decision-making details documented in ED Course.  ECG/medicine tests: ordered and independent interpretation performed. Decision-making details documented in ED Course.    Risk  Prescription drug management.  Diagnosis or treatment significantly limited by social determinants of health.          Independently interpreted x-ray and/or EKG:  Twelve lead EKG shows sinus rhythm, rate of 75.  No ST or T-wave changes.  No ischemia arrhythmia or pericarditis    MDM:    44 y.o. female with atraumatic right hip pain, possibly related to abnormal gait from left hip surgery in the past.  Will initiate treatment with anti-inflammatory, muscle relaxants.  Refer to primary care for further evaluation, likely referral for physical therapy and/or Orthopedics.  Additionally complains of mass on neck which is likely lipoma, will refer to [facility].  Third complaint is symptoms consistent with upper respiratory infection, COVID swab was negative but influenza is positive.  Will initiate symptomatic treatment with Tessalon, Zofran, in the above-mentioned anti-inflammatories.  Encouraged follow-up with the primary care clinic, especially if symptoms persist.  Return precautions discussed for the interim.    Medications   ketorolac injection 30 mg (has no administration in time range)   orphenadrine injection 60 mg (has no administration in time range)       ASSESSMENT:   1. Right hip pain    2. Lipoma of neck    3. Influenza Cyrus Ford II, MD  01/09/25 4106

## 2025-02-11 ENCOUNTER — PATIENT OUTREACH (OUTPATIENT)
Facility: OTHER | Age: 45
End: 2025-02-11
Payer: MEDICAID